# Patient Record
Sex: MALE | Race: BLACK OR AFRICAN AMERICAN | Employment: FULL TIME | URBAN - METROPOLITAN AREA
[De-identification: names, ages, dates, MRNs, and addresses within clinical notes are randomized per-mention and may not be internally consistent; named-entity substitution may affect disease eponyms.]

---

## 2017-03-03 ENCOUNTER — TELEPHONE (OUTPATIENT)
Dept: FAMILY MEDICINE CLINIC | Facility: CLINIC | Age: 47
End: 2017-03-03

## 2017-03-03 RX ORDER — LISINOPRIL 20 MG/1
20 TABLET ORAL 2 TIMES DAILY
Qty: 60 TABLET | Refills: 0 | Status: SHIPPED | OUTPATIENT
Start: 2017-03-03 | End: 2017-03-18

## 2017-03-03 RX ORDER — AMLODIPINE BESYLATE 10 MG/1
10 TABLET ORAL EVERY EVENING
Qty: 30 TABLET | Refills: 0 | Status: SHIPPED | OUTPATIENT
Start: 2017-03-03 | End: 2017-10-05

## 2017-03-03 RX ORDER — HYDROCHLOROTHIAZIDE 12.5 MG/1
12.5 CAPSULE, GELATIN COATED ORAL EVERY MORNING
Qty: 30 CAPSULE | Refills: 0 | Status: SHIPPED | OUTPATIENT
Start: 2017-03-03 | End: 2017-03-18

## 2017-03-03 NOTE — TELEPHONE ENCOUNTER
rxs approved for qty 27 NR  Dr Mara Adams is booked on her Saturday in March and right now there is no Saturday schedule for April. Patient was offered an appt on March 11 at 9:45am but he declined. Said he had another appt.  He was also offered some later ap

## 2017-03-04 ENCOUNTER — OFFICE VISIT (OUTPATIENT)
Dept: FAMILY MEDICINE CLINIC | Facility: CLINIC | Age: 47
End: 2017-03-04

## 2017-03-04 VITALS
OXYGEN SATURATION: 99 % | RESPIRATION RATE: 12 BRPM | BODY MASS INDEX: 30 KG/M2 | HEART RATE: 85 BPM | SYSTOLIC BLOOD PRESSURE: 124 MMHG | TEMPERATURE: 98 F | WEIGHT: 218 LBS | DIASTOLIC BLOOD PRESSURE: 78 MMHG

## 2017-03-04 DIAGNOSIS — E55.9 VITAMIN D DEFICIENCY: Primary | ICD-10-CM

## 2017-03-04 DIAGNOSIS — I10 ESSENTIAL HYPERTENSION, BENIGN: ICD-10-CM

## 2017-03-04 DIAGNOSIS — D64.9 ANEMIA, UNSPECIFIED TYPE: ICD-10-CM

## 2017-03-04 PROCEDURE — 99214 OFFICE O/P EST MOD 30 MIN: CPT | Performed by: FAMILY MEDICINE

## 2017-03-04 RX ORDER — AMLODIPINE BESYLATE 10 MG/1
10 TABLET ORAL EVERY MORNING
Qty: 90 TABLET | Refills: 0 | Status: SHIPPED | OUTPATIENT
Start: 2017-03-04 | End: 2017-03-18

## 2017-03-04 RX ORDER — LOSARTAN POTASSIUM 50 MG/1
50 TABLET ORAL EVERY MORNING
Qty: 90 TABLET | Refills: 0 | Status: SHIPPED | OUTPATIENT
Start: 2017-03-04 | End: 2017-07-01

## 2017-03-04 NOTE — PROGRESS NOTES
HPI:    Patient ID: Andres Amaya is a 56year old male. HPI  Patient is here for follow-up of high blood pressure, anemia, low vitamin D. In the past she had taken low vitamin D powder supplement 2 bottles.   He is not currently taking vitamin D. rales, air entry is adequate, no accessory respiratory muscle use, no chest asymmetry, normal excursion. GI:  bowel sound positive in all four quadrants, abdomen is soft, non-tender, non-distended, no hepatosplenomegaly, no abnormal aortic pulsation.    NE

## 2017-03-18 ENCOUNTER — TELEPHONE (OUTPATIENT)
Dept: FAMILY MEDICINE CLINIC | Facility: CLINIC | Age: 47
End: 2017-03-18

## 2017-03-18 ENCOUNTER — OFFICE VISIT (OUTPATIENT)
Dept: FAMILY MEDICINE CLINIC | Facility: CLINIC | Age: 47
End: 2017-03-18

## 2017-03-18 VITALS
RESPIRATION RATE: 12 BRPM | OXYGEN SATURATION: 99 % | BODY MASS INDEX: 30.66 KG/M2 | HEART RATE: 87 BPM | DIASTOLIC BLOOD PRESSURE: 80 MMHG | WEIGHT: 219 LBS | SYSTOLIC BLOOD PRESSURE: 124 MMHG | HEIGHT: 71 IN

## 2017-03-18 DIAGNOSIS — I10 ESSENTIAL HYPERTENSION, BENIGN: Primary | ICD-10-CM

## 2017-03-18 DIAGNOSIS — D64.9 ANEMIA, UNSPECIFIED TYPE: ICD-10-CM

## 2017-03-18 DIAGNOSIS — Z00.00 ROUTINE ADULT HEALTH MAINTENANCE: Primary | ICD-10-CM

## 2017-03-18 DIAGNOSIS — E55.9 VITAMIN D DEFICIENCY: ICD-10-CM

## 2017-03-18 DIAGNOSIS — Z00.00 LABORATORY EXAM ORDERED AS PART OF ROUTINE GENERAL MEDICAL EXAMINATION: ICD-10-CM

## 2017-03-18 PROCEDURE — 99396 PREV VISIT EST AGE 40-64: CPT | Performed by: FAMILY MEDICINE

## 2017-03-20 LAB
ABSOLUTE BASOPHILS: 15 CELLS/UL (ref 0–200)
ABSOLUTE EOSINOPHILS: 281 CELLS/UL (ref 15–500)
ABSOLUTE LYMPHOCYTES: 1841 CELLS/UL (ref 850–3900)
ABSOLUTE MONOCYTES: 296 CELLS/UL (ref 200–950)
ABSOLUTE NEUTROPHILS: 2667 CELLS/UL (ref 1500–7800)
ALBUMIN/GLOBULIN RATIO: 1.2 (CALC) (ref 1–2.5)
ALBUMIN: 4.2 G/DL (ref 3.6–5.1)
ALKALINE PHOSPHATASE: 86 U/L (ref 40–115)
ALT: 20 U/L (ref 9–46)
AST: 21 U/L (ref 10–40)
BASOPHILS: 0.3 %
BILIRUBIN, TOTAL: 0.4 MG/DL (ref 0.2–1.2)
BUN: 10 MG/DL (ref 7–25)
CALCIUM: 9.5 MG/DL (ref 8.6–10.3)
CARBON DIOXIDE: 29 MMOL/L (ref 20–31)
CHLORIDE: 104 MMOL/L (ref 98–110)
CHOL/HDLC RATIO: 2.6 (CALC)
CHOLESTEROL, TOTAL: 118 MG/DL (ref 125–200)
CREATININE: 1.05 MG/DL (ref 0.6–1.35)
EGFR IF AFRICN AM: 98 ML/MIN/1.73M2
EGFR IF NONAFRICN AM: 85 ML/MIN/1.73M2
EOSINOPHILS: 5.5 %
GLOBULIN: 3.4 G/DL (CALC) (ref 1.9–3.7)
GLUCOSE: 97 MG/DL (ref 65–99)
HDL CHOLESTEROL: 46 MG/DL
HEMATOCRIT: 40.4 % (ref 38.5–50)
HEMOGLOBIN: 12.8 G/DL (ref 13.2–17.1)
LDL-CHOLESTEROL: 61 MG/DL (CALC)
LYMPHOCYTES: 36.1 %
MCH: 24.8 PG (ref 27–33)
MCHC: 31.8 G/DL (ref 32–36)
MCV: 78.2 FL (ref 80–100)
MONOCYTES: 5.8 %
MPV: 10.1 FL (ref 7.5–12.5)
NEUTROPHILS: 52.3 %
NON-HDL CHOLESTEROL: 72 MG/DL (CALC)
PLATELET COUNT: 214 THOUSAND/UL (ref 140–400)
POTASSIUM: 3.5 MMOL/L (ref 3.5–5.3)
PROTEIN, TOTAL: 7.6 G/DL (ref 6.1–8.1)
RDW: 16.7 % (ref 11–15)
RED BLOOD CELL COUNT: 5.17 MILLION/UL (ref 4.2–5.8)
SODIUM: 141 MMOL/L (ref 135–146)
TRIGLYCERIDES: 54 MG/DL
VITAMIN D, 25-OH, TOTAL: 42 NG/ML (ref 30–100)
WHITE BLOOD CELL COUNT: 5.1 THOUSAND/UL (ref 3.8–10.8)

## 2017-03-21 NOTE — PROGRESS NOTES
Alicia Fairbanks is a 56year old male who presents for a complete physical exam.   HPI:   Pt complains of nothing.   Urination changes no  ED symptoms no  Immunizations needed no  Wt Readings from Last 6 Encounters:  03/18/17 : 219 lb  03/04/17 : 218 lb LYMPHOCYTES 36.1 %   MONOCYTES 5.8 %   EOSINOPHILS 5.5 %   BASOPHILS 0.3 %   -LIPID PANEL   Result Value Ref Range   CHOLESTEROL, TOTAL 118 (L) 125 - 200 mg/dL   HDL CHOLESTEROL 46 > OR = 40 mg/dL   TRIGLYCERIDES 54 <150 mg/dL   LDL-CHOLESTEROL 61 <130 m glands/polyuria/polydypsia  ALL/ASTHMA: denies allergic rhinitis/asthma    EXAM:   /80 mmHg  Pulse 87  Resp 12  Ht 71\"  Wt 219 lb  BMI 30.56 kg/m2  SpO2 99%  Body mass index is 30.56 kg/(m^2).    GENERAL: NAD, pleasant, well developed, normal voice history of colon or prostate cancer, patient instructed to get colon cancer screening and prostate cancer screening done which were discussed in detail. Pt educated on immunizations appropriate for age.      The patient verbalizes understanding of these

## 2017-07-03 RX ORDER — AMLODIPINE BESYLATE 10 MG/1
TABLET ORAL
Qty: 90 TABLET | Refills: 0 | Status: SHIPPED | OUTPATIENT
Start: 2017-07-03 | End: 2017-11-15

## 2017-07-03 RX ORDER — LOSARTAN POTASSIUM 50 MG/1
TABLET ORAL
Qty: 90 TABLET | Refills: 0 | Status: SHIPPED | OUTPATIENT
Start: 2017-07-03 | End: 2017-10-05

## 2017-07-03 NOTE — TELEPHONE ENCOUNTER
Refill protocol passed because the patient met the following protocol for    No future appointments.

## 2017-10-05 RX ORDER — LOSARTAN POTASSIUM 50 MG/1
TABLET ORAL
Qty: 90 TABLET | Refills: 0 | Status: SHIPPED | OUTPATIENT
Start: 2017-10-05 | End: 2018-01-30

## 2017-10-05 RX ORDER — AMLODIPINE BESYLATE 10 MG/1
10 TABLET ORAL EVERY EVENING
Qty: 30 TABLET | Refills: 0 | Status: SHIPPED | OUTPATIENT
Start: 2017-10-05 | End: 2017-11-11

## 2017-11-11 ENCOUNTER — APPOINTMENT (OUTPATIENT)
Dept: LAB | Age: 47
End: 2017-11-11
Attending: FAMILY MEDICINE
Payer: COMMERCIAL

## 2017-11-11 ENCOUNTER — OFFICE VISIT (OUTPATIENT)
Dept: FAMILY MEDICINE CLINIC | Facility: CLINIC | Age: 47
End: 2017-11-11

## 2017-11-11 VITALS
WEIGHT: 219 LBS | BODY MASS INDEX: 31 KG/M2 | OXYGEN SATURATION: 100 % | SYSTOLIC BLOOD PRESSURE: 138 MMHG | HEART RATE: 101 BPM | RESPIRATION RATE: 12 BRPM | DIASTOLIC BLOOD PRESSURE: 78 MMHG

## 2017-11-11 DIAGNOSIS — I10 ESSENTIAL HYPERTENSION, BENIGN: Primary | ICD-10-CM

## 2017-11-11 DIAGNOSIS — D64.9 ANEMIA, UNSPECIFIED TYPE: ICD-10-CM

## 2017-11-11 PROCEDURE — 36415 COLL VENOUS BLD VENIPUNCTURE: CPT | Performed by: FAMILY MEDICINE

## 2017-11-11 PROCEDURE — 85027 COMPLETE CBC AUTOMATED: CPT | Performed by: FAMILY MEDICINE

## 2017-11-11 PROCEDURE — 99214 OFFICE O/P EST MOD 30 MIN: CPT | Performed by: FAMILY MEDICINE

## 2017-11-15 RX ORDER — AMLODIPINE BESYLATE 10 MG/1
TABLET ORAL
Qty: 90 TABLET | Refills: 0 | Status: SHIPPED | OUTPATIENT
Start: 2017-11-15 | End: 2019-11-09

## 2017-11-15 NOTE — PROGRESS NOTES
HPI:    Patient ID: Andres Amaya is a 56year old male. HPI  Patient is here for follow-up of high blood pressure. He feels well and has no complaints.   Review of Systems  Negative except for the above         Current Outpatient Prescriptions:  Abida

## 2018-01-29 RX ORDER — AMLODIPINE BESYLATE 10 MG/1
TABLET ORAL
Qty: 90 TABLET | Refills: 0 | Status: CANCELLED | OUTPATIENT
Start: 2018-01-29

## 2018-01-29 RX ORDER — LOSARTAN POTASSIUM 50 MG/1
TABLET ORAL
Qty: 90 TABLET | Refills: 0 | Status: CANCELLED | OUTPATIENT
Start: 2018-01-29

## 2018-01-30 RX ORDER — LOSARTAN POTASSIUM 50 MG/1
TABLET ORAL
Qty: 90 TABLET | Refills: 0 | Status: SHIPPED | OUTPATIENT
Start: 2018-01-30 | End: 2018-04-25

## 2018-01-30 RX ORDER — AMLODIPINE BESYLATE 10 MG/1
TABLET ORAL
Qty: 90 TABLET | Refills: 0 | Status: SHIPPED | OUTPATIENT
Start: 2018-01-30 | End: 2018-04-25

## 2018-01-30 NOTE — TELEPHONE ENCOUNTER
Requesting: Losartan 50mg and Amlodipine 10mg  LOV: 11/11/17  RTC: -  Last Relevant Labs: 3/18/17  Filled: 10/5/17 #90 with 0 refills - Losartan 50mg  Filled: 11/15/17 #90 with 0 refills - Amlodipine 10mg    No future appointments.     Losartan and Amlodipi

## 2018-04-18 ENCOUNTER — HOSPITAL ENCOUNTER (OUTPATIENT)
Age: 48
Discharge: HOME | End: 2018-04-18
Attending: FAMILY MEDICINE
Payer: COMMERCIAL

## 2018-04-18 VITALS
RESPIRATION RATE: 20 BRPM | TEMPERATURE: 98 F | HEART RATE: 94 BPM | OXYGEN SATURATION: 97 % | SYSTOLIC BLOOD PRESSURE: 148 MMHG | DIASTOLIC BLOOD PRESSURE: 81 MMHG

## 2018-04-18 DIAGNOSIS — K05.6 PERIODONTAL DISEASE: ICD-10-CM

## 2018-04-18 DIAGNOSIS — I88.9 CERVICAL ADENITIS: ICD-10-CM

## 2018-04-18 DIAGNOSIS — M62.838 TRAPEZIUS MUSCLE SPASM: Primary | ICD-10-CM

## 2018-04-18 PROCEDURE — 99213 OFFICE O/P EST LOW 20 MIN: CPT

## 2018-04-18 PROCEDURE — 99204 OFFICE O/P NEW MOD 45 MIN: CPT

## 2018-04-18 RX ORDER — AMOXICILLIN AND CLAVULANATE POTASSIUM 875; 125 MG/1; MG/1
1 TABLET, FILM COATED ORAL 2 TIMES DAILY
Qty: 20 TABLET | Refills: 0 | Status: SHIPPED | OUTPATIENT
Start: 2018-04-18 | End: 2018-04-28

## 2018-04-18 RX ORDER — NAPROXEN 500 MG/1
500 TABLET ORAL 2 TIMES DAILY PRN
Qty: 20 TABLET | Refills: 0 | Status: SHIPPED | OUTPATIENT
Start: 2018-04-18 | End: 2018-04-25

## 2018-04-18 RX ORDER — CYCLOBENZAPRINE HCL 10 MG
10 TABLET ORAL 3 TIMES DAILY PRN
Qty: 15 TABLET | Refills: 0 | Status: SHIPPED | OUTPATIENT
Start: 2018-04-18 | End: 2018-04-23

## 2018-04-18 RX ORDER — COVID-19 ANTIGEN TEST
220 KIT MISCELLANEOUS
COMMUNITY

## 2018-04-18 NOTE — ED INITIAL ASSESSMENT (HPI)
Patient presents with cc of right neck swelling/tenderness for about a month. No fever noted. Went to dentist thinking it was gum related but everthing checked out. Also,c/o right shoulder pain x 5 days. He states unable to sleep due to poor pillows. No direct

## 2018-04-18 NOTE — ED PROVIDER NOTES
Patient Seen in: [facility] [place]    History   Patient presents with:  Swelling  Shoulder Pain    Stated Complaint: COLD X 1 MONTH / SHOULDER PAIN RIGHT    HPI  52 yo M here with complaints of cold X 1 month and now with R shoulder pa distended  NEURO: Alert and oriented to person place and time  GAIT: Normal          ED Course   Labs Reviewed - No data to display    Orders Placed This Encounter      Cyclobenzaprine HCl 10 MG Oral Tab          Sig: Take 1 tablet (10 mg total) by mouth 3 disease  Cervical adenitis    Disposition:  Discharge  4/18/2018  9:06 am    Follow-up:  Onur Zhang MD  [address] [place], [place] [id number] [phone]    In 1 week  As needed        Medications Prescribed:  Discharge

## 2018-04-25 ENCOUNTER — TELEPHONE (OUTPATIENT)
Dept: FAMILY MEDICINE CLINIC | Facility: CLINIC | Age: 48
End: 2018-04-25

## 2018-04-25 ENCOUNTER — HOSPITAL ENCOUNTER (OUTPATIENT)
Age: 48
Discharge: HOME | End: 2018-04-25
Payer: COMMERCIAL

## 2018-04-25 VITALS
DIASTOLIC BLOOD PRESSURE: 87 MMHG | HEART RATE: 93 BPM | SYSTOLIC BLOOD PRESSURE: 144 MMHG | TEMPERATURE: 99 F | OXYGEN SATURATION: 98 % | RESPIRATION RATE: 20 BRPM

## 2018-04-25 DIAGNOSIS — J02.0 STREPTOCOCCUS PHARYNGITIS: Primary | ICD-10-CM

## 2018-04-25 PROCEDURE — 99213 OFFICE O/P EST LOW 20 MIN: CPT

## 2018-04-25 PROCEDURE — 87430 STREP A AG IA: CPT | Performed by: PHYSICIAN ASSISTANT

## 2018-04-25 PROCEDURE — 99214 OFFICE O/P EST MOD 30 MIN: CPT

## 2018-04-25 RX ORDER — METHYLPREDNISOLONE 4 MG/1
TABLET ORAL
Qty: 1 PACKAGE | Refills: 0 | Status: SHIPPED | OUTPATIENT
Start: 2018-04-25 | End: 2018-09-04

## 2018-04-25 RX ORDER — CYCLOBENZAPRINE HCL 10 MG
10 TABLET ORAL 3 TIMES DAILY PRN
COMMUNITY
End: 2018-09-04

## 2018-04-25 RX ORDER — LOSARTAN POTASSIUM 50 MG/1
TABLET ORAL
Qty: 30 TABLET | Refills: 0 | Status: SHIPPED | OUTPATIENT
Start: 2018-04-25 | End: 2018-05-14

## 2018-04-25 RX ORDER — AMLODIPINE BESYLATE 10 MG/1
TABLET ORAL
Qty: 30 TABLET | Refills: 0 | Status: SHIPPED | OUTPATIENT
Start: 2018-04-25 | End: 2018-05-14

## 2018-04-25 RX ORDER — LOSARTAN POTASSIUM 50 MG/1
TABLET ORAL
Qty: 90 TABLET | Refills: 0 | Status: CANCELLED | OUTPATIENT
Start: 2018-04-25

## 2018-04-25 RX ORDER — AMLODIPINE BESYLATE 10 MG/1
TABLET ORAL
Qty: 90 TABLET | Refills: 0 | Status: CANCELLED | OUTPATIENT
Start: 2018-04-25

## 2018-04-25 RX ORDER — AZITHROMYCIN 250 MG/1
TABLET, FILM COATED ORAL
Qty: 1 PACKAGE | Refills: 0 | Status: SHIPPED | OUTPATIENT
Start: 2018-04-25 | End: 2018-04-30

## 2018-04-25 NOTE — TELEPHONE ENCOUNTER
Requesting Losartan and Amlodiine  LOV: 11/11/17  RTC: not noted  Last Relevant Labs: 3/18/17  Filled: 1/30/18 #90 with 0 refills    Future Appointments  Date Time Provider [address]   5/14/2018 10:15 AM Onur Zhang MD [facility] [address] [facility] [address]

## 2018-04-25 NOTE — ED INITIAL ASSESSMENT (HPI)
Complains of sore throat, ear pressure, neck pain and shoulder pain for the last five days. Currently on Augmentin and Flexeril that was given on 4/18/18 which has not helped.

## 2018-04-25 NOTE — ED PROVIDER NOTES
Patient Seen in: THE [facility]    History   Patient presents with:  Sore Throat    Stated Complaint: SORE THROAT, EAR PAIN    HPI    Patient is a pleasant 45-year-old male.   7 days prior to arrival, patient was evaluated at this fa rhinorrhea  Lung: No distress, RR, no retraction, breath sounds are clear bilaterally  Cardio: Regular rate and rhythm, normal S1-S2, no murmur appreciable  Extremities: Full ROM, no deformity, NVI  Back: Full range of motion  Skin: No sign of trauma, Skin

## 2018-05-05 ENCOUNTER — HOSPITAL ENCOUNTER (OUTPATIENT)
Age: 48
Discharge: HOME | End: 2018-05-05
Payer: COMMERCIAL

## 2018-05-05 VITALS
SYSTOLIC BLOOD PRESSURE: 149 MMHG | OXYGEN SATURATION: 98 % | RESPIRATION RATE: 22 BRPM | HEART RATE: 87 BPM | DIASTOLIC BLOOD PRESSURE: 86 MMHG | TEMPERATURE: 98 F

## 2018-05-05 DIAGNOSIS — K12.0 APHTHOUS STOMATITIS: ICD-10-CM

## 2018-05-05 DIAGNOSIS — J02.9 PHARYNGITIS, UNSPECIFIED ETIOLOGY: Primary | ICD-10-CM

## 2018-05-05 PROCEDURE — 99214 OFFICE O/P EST MOD 30 MIN: CPT

## 2018-05-05 PROCEDURE — 87081 CULTURE SCREEN ONLY: CPT | Performed by: NURSE PRACTITIONER

## 2018-05-05 PROCEDURE — 87430 STREP A AG IA: CPT | Performed by: NURSE PRACTITIONER

## 2018-05-05 RX ORDER — CEFDINIR 300 MG/1
300 CAPSULE ORAL 2 TIMES DAILY
Qty: 20 CAPSULE | Refills: 0 | Status: SHIPPED | OUTPATIENT
Start: 2018-05-05 | End: 2018-05-15

## 2018-05-05 NOTE — ED INITIAL ASSESSMENT (HPI)
Here for possible reoccurrence of strep throat. Sts after he completed antibiotic 2 days later his ST returned.

## 2018-05-05 NOTE — ED PROVIDER NOTES
Patient Seen in: THE [facility]    History   Patient presents with:  Sore Throat    Stated Complaint: Sore Throat x 15 days    HPI  Patient is 52-year-old male who presents with 4 day history of sore throat  And ulcers on gums and o Constitutional: He is oriented to person, place, and time. He appears well-developed and well-nourished. No distress.    HENT:   Right Ear: Tympanic membrane and external ear normal.   Left Ear: Tympanic membrane and external ear normal.   Nose: Nose norm appearance and there is no cervical adenopathy.           Disposition and Plan     Clinical Impression:  Pharyngitis, unspecified etiology  (primary encounter diagnosis)  Aphthous stomatitis    Disposition:  Discharge  5/5/2018  4:58 pm    Follow-up:  Sandra

## 2018-05-14 ENCOUNTER — HOSPITAL ENCOUNTER (OUTPATIENT)
Dept: GENERAL RADIOLOGY | Age: 48
Discharge: HOME | End: 2018-05-14
Attending: FAMILY MEDICINE
Payer: COMMERCIAL

## 2018-05-14 ENCOUNTER — OFFICE VISIT (OUTPATIENT)
Dept: FAMILY MEDICINE CLINIC | Facility: CLINIC | Age: 48
End: 2018-05-14

## 2018-05-14 ENCOUNTER — APPOINTMENT (OUTPATIENT)
Dept: LAB | Age: 48
End: 2018-05-14
Attending: FAMILY MEDICINE
Payer: COMMERCIAL

## 2018-05-14 VITALS
HEIGHT: 71 IN | TEMPERATURE: 98 F | BODY MASS INDEX: 31.15 KG/M2 | OXYGEN SATURATION: 99 % | RESPIRATION RATE: 14 BRPM | DIASTOLIC BLOOD PRESSURE: 78 MMHG | HEART RATE: 79 BPM | SYSTOLIC BLOOD PRESSURE: 124 MMHG | WEIGHT: 222.5 LBS

## 2018-05-14 DIAGNOSIS — Z00.00 ROUTINE ADULT HEALTH MAINTENANCE: ICD-10-CM

## 2018-05-14 DIAGNOSIS — R06.00 DYSPNEA, UNSPECIFIED TYPE: ICD-10-CM

## 2018-05-14 DIAGNOSIS — Z13.31 NEGATIVE DEPRESSION SCREENING: ICD-10-CM

## 2018-05-14 DIAGNOSIS — J02.0 STREP THROAT: ICD-10-CM

## 2018-05-14 DIAGNOSIS — I10 ESSENTIAL HYPERTENSION, BENIGN: ICD-10-CM

## 2018-05-14 DIAGNOSIS — M25.511 ACUTE PAIN OF RIGHT SHOULDER: Primary | ICD-10-CM

## 2018-05-14 DIAGNOSIS — E55.9 VITAMIN D DEFICIENCY: ICD-10-CM

## 2018-05-14 PROCEDURE — 99214 OFFICE O/P EST MOD 30 MIN: CPT | Performed by: FAMILY MEDICINE

## 2018-05-14 PROCEDURE — 84439 ASSAY OF FREE THYROXINE: CPT

## 2018-05-14 PROCEDURE — 84443 ASSAY THYROID STIM HORMONE: CPT

## 2018-05-14 PROCEDURE — 71046 X-RAY EXAM CHEST 2 VIEWS: CPT | Performed by: FAMILY MEDICINE

## 2018-05-14 PROCEDURE — 80050 GENERAL HEALTH PANEL: CPT | Performed by: FAMILY MEDICINE

## 2018-05-14 PROCEDURE — 93010 ELECTROCARDIOGRAM REPORT: CPT | Performed by: INTERNAL MEDICINE

## 2018-05-14 PROCEDURE — 80061 LIPID PANEL: CPT | Performed by: FAMILY MEDICINE

## 2018-05-14 PROCEDURE — 82306 VITAMIN D 25 HYDROXY: CPT | Performed by: FAMILY MEDICINE

## 2018-05-14 PROCEDURE — 36415 COLL VENOUS BLD VENIPUNCTURE: CPT | Performed by: FAMILY MEDICINE

## 2018-05-14 PROCEDURE — 93005 ELECTROCARDIOGRAM TRACING: CPT

## 2018-05-14 RX ORDER — AMLODIPINE BESYLATE 10 MG/1
TABLET ORAL
Qty: 90 TABLET | Refills: 1 | Status: SHIPPED | OUTPATIENT
Start: 2018-05-14 | End: 2018-09-04

## 2018-05-14 RX ORDER — LOSARTAN POTASSIUM 50 MG/1
TABLET ORAL
Qty: 90 TABLET | Refills: 1 | Status: SHIPPED | OUTPATIENT
Start: 2018-05-14 | End: 2018-12-10

## 2018-05-14 NOTE — PROGRESS NOTES
HPI:    Patient ID: Barak Crowe is a 53year old male. HPI  Patient presents with:  Hypertension: pt states he has been doubling both bp meds due to elevated blood pressure    f/u of right shoulder pain, strep throat.   Patient states of getting so hypertension, benign  Dyspnea, unspecified type  Vitamin d deficiency  Routine adult health maintenance  Negative depression screening  Right shoulder pain, changed pillow and mattress.   Regarding the shoulder pain I would recommend anti-inflammatory, stre

## 2018-05-15 PROBLEM — E55.9 VITAMIN D DEFICIENCY: Status: ACTIVE | Noted: 2018-05-15

## 2018-05-15 PROBLEM — M25.511 ACUTE PAIN OF RIGHT SHOULDER: Status: ACTIVE | Noted: 2018-05-15

## 2018-05-15 PROBLEM — J02.0 STREP THROAT: Status: ACTIVE | Noted: 2018-05-15

## 2018-05-15 PROBLEM — R06.00 DYSPNEA: Status: ACTIVE | Noted: 2018-05-15

## 2018-05-17 ENCOUNTER — OFFICE VISIT (OUTPATIENT)
Dept: FAMILY MEDICINE CLINIC | Facility: CLINIC | Age: 48
End: 2018-05-17

## 2018-05-17 VITALS
DIASTOLIC BLOOD PRESSURE: 80 MMHG | BODY MASS INDEX: 31.08 KG/M2 | WEIGHT: 222 LBS | RESPIRATION RATE: 12 BRPM | HEIGHT: 71 IN | SYSTOLIC BLOOD PRESSURE: 128 MMHG | TEMPERATURE: 98 F | OXYGEN SATURATION: 99 % | HEART RATE: 91 BPM

## 2018-05-17 DIAGNOSIS — Z00.00 ROUTINE ADULT HEALTH MAINTENANCE: Primary | ICD-10-CM

## 2018-05-17 DIAGNOSIS — D72.819 LEUKOPENIA, UNSPECIFIED TYPE: ICD-10-CM

## 2018-05-17 PROCEDURE — 99396 PREV VISIT EST AGE 40-64: CPT | Performed by: FAMILY MEDICINE

## 2018-05-17 NOTE — PROGRESS NOTES
Lucio Thurston is a 53year old male who presents for a complete physical exam.   HPI:   Pt complains of nothing.   Urination changes no  ED symptoms no  Immunizations needed no  Wt Readings from Last 6 Encounters:  05/17/18 : 222 lb  05/14/18 : 222 lb 8 Cancer Father       Social History:  Smoking status: Never Smoker                                                              Smokeless tobacco: Never Used                      Alcohol use:  No                  REVIEW OF SYSTEMS:   GENERAL: feels well over BS, non-distended, non-tender to palpation, no HSM/masses/pulsations  MUSCULOSKELETAL: Back with normal AROM, no joint swelling, extremities x 4 with normal strength 5/5 and symmetric and with normal AROM/PROM.    EXTREMITIES: no cyanosis, clubbing or edema [E]

## 2018-05-22 ENCOUNTER — TELEPHONE (OUTPATIENT)
Dept: FAMILY MEDICINE CLINIC | Facility: CLINIC | Age: 48
End: 2018-05-22

## 2018-05-22 DIAGNOSIS — D72.819 LEUKOPENIA, UNSPECIFIED TYPE: Primary | ICD-10-CM

## 2018-05-22 NOTE — TELEPHONE ENCOUNTER
Test: HIV AG AB COMBO  Order Status Reason By On   Canceled Cancelled by Lab Lourdes Smith 5/17/18 2124   No Gold top received.  Follow up task put in.      Margretag Jensen states that they did not receive a gold top tube to run HIV Combo test. Lab states that if test

## 2018-05-23 NOTE — TELEPHONE ENCOUNTER
Patient informed that [facility] did not draw the HIV Doctor had ordered. New order placed and patient will go to [facility] and have done.

## 2018-06-06 NOTE — TELEPHONE ENCOUNTER
Pt is calling to follow up on rx request for   Lidocaine Viscous 2 % Mouth/Throat Solution 100 mL 0 5/5/2018     Sig - Route: Take 5 mL by mouth every 3 (three) hours as needed for Pain (sore throat).  - Oral    E-Prescribing Status: Receipt confirmed by ph

## 2018-06-07 NOTE — TELEPHONE ENCOUNTER
Requesting Lidocaine Viscous 2%  LOV: 5/17/18  RTC: not noted  Last Relevant Labs: n/a  Filled: 5/5/18 #100 ml with 0 refills  Given by another provider    Future Appointments  Date Time Provider [address]   10/13/2018 11:30 AM Liv Sims MD

## 2018-07-10 ENCOUNTER — TELEPHONE (OUTPATIENT)
Dept: FAMILY MEDICINE CLINIC | Facility: CLINIC | Age: 48
End: 2018-07-10

## 2018-07-10 DIAGNOSIS — D72.819 LEUKOPENIA, UNSPECIFIED TYPE: Primary | ICD-10-CM

## 2018-07-10 NOTE — TELEPHONE ENCOUNTER
Patient got his letter to go over his results. Given result of lab and need to repeat in 6 months. Patient verbalized understanding. Order placed for recheck of HIV.

## 2018-08-28 ENCOUNTER — TELEPHONE (OUTPATIENT)
Dept: FAMILY MEDICINE CLINIC | Facility: CLINIC | Age: 48
End: 2018-08-28

## 2018-08-28 NOTE — TELEPHONE ENCOUNTER
Pt has been battling with sore throats, sores in mouth & strep at one time with in the last 3 months and he cant seem to shake this off. He is not sure what he should do . ..  He is questioning if he may need to see a specialist or does he need to be seen

## 2018-08-28 NOTE — TELEPHONE ENCOUNTER
Patient notified we would want to see him since he hasnt been seen since May. Patient scheduled the following appt:      Future Appointments  Date Time Provider John [address]   9/4/2018 1:30 PM Tommy Calixto MD [facility] [address] [facility] [address]   10/13/2018 11:30

## 2018-09-04 ENCOUNTER — APPOINTMENT (OUTPATIENT)
Dept: LAB | Age: 48
End: 2018-09-04
Attending: FAMILY MEDICINE
Payer: COMMERCIAL

## 2018-09-04 ENCOUNTER — OFFICE VISIT (OUTPATIENT)
Dept: FAMILY MEDICINE CLINIC | Facility: CLINIC | Age: 48
End: 2018-09-04
Payer: COMMERCIAL

## 2018-09-04 VITALS
HEIGHT: 71 IN | SYSTOLIC BLOOD PRESSURE: 128 MMHG | HEART RATE: 82 BPM | RESPIRATION RATE: 12 BRPM | TEMPERATURE: 98 F | DIASTOLIC BLOOD PRESSURE: 78 MMHG | WEIGHT: 227 LBS | BODY MASS INDEX: 31.78 KG/M2 | OXYGEN SATURATION: 99 %

## 2018-09-04 DIAGNOSIS — B00.1 RECURRENT COLD SORES: ICD-10-CM

## 2018-09-04 DIAGNOSIS — L98.9 SKIN LESION: ICD-10-CM

## 2018-09-04 DIAGNOSIS — L98.9 SKIN LESION: Primary | ICD-10-CM

## 2018-09-04 DIAGNOSIS — J31.2 SORE THROAT, CHRONIC: ICD-10-CM

## 2018-09-04 PROCEDURE — 36415 COLL VENOUS BLD VENIPUNCTURE: CPT | Performed by: FAMILY MEDICINE

## 2018-09-04 PROCEDURE — 86694 HERPES SIMPLEX NES ANTBDY: CPT | Performed by: FAMILY MEDICINE

## 2018-09-04 PROCEDURE — 99213 OFFICE O/P EST LOW 20 MIN: CPT | Performed by: FAMILY MEDICINE

## 2018-09-04 RX ORDER — RIBOFLAVIN (VITAMIN B2) 100 MG
100 TABLET ORAL DAILY
COMMUNITY

## 2018-09-06 LAB
HSV TYPE 1/2 COMBINED AB, IGG: >22.4 IV
HSV TYPE 1/2 COMBINED ABS, IGM: 0.36 IV

## 2018-09-06 NOTE — PROGRESS NOTES
HPI:    Patient ID: Libia Turcios is a 53year old male. HPI  Patient is here with complaint of having recurrent tonsillitis and pharyngitis as well as some sores on the outside of his mouth and on the inside this occurs frequently.   Right now he do length regarding recurrent sore throat and cold sores.     Orders Placed This Encounter      Herpes Simplex Virus I/II IgG and IgM [E]    Meds This Visit:  No prescriptions requested or ordered in this encounter       Imaging & Referrals:  ENT - INTERNAL

## 2018-09-13 ENCOUNTER — TELEPHONE (OUTPATIENT)
Dept: FAMILY MEDICINE CLINIC | Facility: CLINIC | Age: 48
End: 2018-09-13

## 2018-10-13 ENCOUNTER — OFFICE VISIT (OUTPATIENT)
Dept: FAMILY MEDICINE CLINIC | Facility: CLINIC | Age: 48
End: 2018-10-13
Payer: COMMERCIAL

## 2018-10-13 VITALS
SYSTOLIC BLOOD PRESSURE: 120 MMHG | OXYGEN SATURATION: 100 % | HEART RATE: 93 BPM | BODY MASS INDEX: 31.36 KG/M2 | WEIGHT: 224 LBS | TEMPERATURE: 98 F | DIASTOLIC BLOOD PRESSURE: 82 MMHG | RESPIRATION RATE: 12 BRPM | HEIGHT: 71 IN

## 2018-10-13 DIAGNOSIS — I10 ESSENTIAL HYPERTENSION, BENIGN: Primary | ICD-10-CM

## 2018-10-13 PROCEDURE — 99213 OFFICE O/P EST LOW 20 MIN: CPT | Performed by: FAMILY MEDICINE

## 2018-10-13 NOTE — PROGRESS NOTES
HPI:    Patient ID: Mike Ortiz is a 53year old male. HPI  Patient is here for follow-up of high blood pressure. He is doing well and has no complaints. He does have a chronic sore throat and some ulcers in the inner part of his cheeks.   He is lesions in the inner cheeks. Follow-up with an ENT recommendation as well. Blood pressure well controlled. Continue amlodipine and losartan as directed.   Patient will follow-up with me in May 2019 for complete physical.  No orders of the defined types

## 2018-12-11 RX ORDER — AMLODIPINE BESYLATE 10 MG/1
TABLET ORAL
Qty: 90 TABLET | Refills: 1 | Status: SHIPPED | OUTPATIENT
Start: 2018-12-11 | End: 2019-03-09

## 2018-12-11 RX ORDER — LOSARTAN POTASSIUM 50 MG/1
TABLET ORAL
Qty: 90 TABLET | Refills: 1 | Status: SHIPPED | OUTPATIENT
Start: 2018-12-11 | End: 2019-06-12

## 2018-12-11 NOTE — TELEPHONE ENCOUNTER
Requesting Amlodipine and Losartan  LOV: 10/13/18  RTC: not noted  Last Relevant Labs: 5/14/18  Filled: 5/14/18 #90 with 1 refills  Losartan  Filled: 5/14/18 #90 with 1 refill  Amlodipine    Future Appointments   Date Time Provider John Lane   6/8/

## 2019-03-09 ENCOUNTER — OFFICE VISIT (OUTPATIENT)
Dept: FAMILY MEDICINE CLINIC | Facility: CLINIC | Age: 49
End: 2019-03-09
Payer: COMMERCIAL

## 2019-03-09 VITALS
HEART RATE: 84 BPM | BODY MASS INDEX: 31.64 KG/M2 | SYSTOLIC BLOOD PRESSURE: 128 MMHG | TEMPERATURE: 98 F | OXYGEN SATURATION: 100 % | RESPIRATION RATE: 12 BRPM | HEIGHT: 71 IN | DIASTOLIC BLOOD PRESSURE: 80 MMHG | WEIGHT: 226 LBS

## 2019-03-09 DIAGNOSIS — J03.90 TONSILLITIS: Primary | ICD-10-CM

## 2019-03-09 DIAGNOSIS — R68.84 JAW PAIN: ICD-10-CM

## 2019-03-09 PROCEDURE — 99213 OFFICE O/P EST LOW 20 MIN: CPT | Performed by: FAMILY MEDICINE

## 2019-03-09 RX ORDER — PREDNISONE 20 MG/1
20 TABLET ORAL 2 TIMES DAILY
Qty: 10 TABLET | Refills: 0 | Status: SHIPPED | OUTPATIENT
Start: 2019-03-09 | End: 2019-03-14

## 2019-03-09 NOTE — PROGRESS NOTES
HPI:   Leeann Chauhan is a 51year old male that presents for cold symptoms. Patient is here with cold symptoms. He states he did see ENT and is very displeased with treatment he received.  He was given additional antibiotics and symptoms are recurren the following:    Height as of this encounter: 71\". Weight as of this encounter: 226 lb. Vital signs reviewed. Appears stated age, well groomed.   Physical Exam:  GEN:  Patient is alert, awake and oriented, well developed, well nourished, no apparent d

## 2019-03-16 ENCOUNTER — OFFICE VISIT (OUTPATIENT)
Dept: FAMILY MEDICINE CLINIC | Facility: CLINIC | Age: 49
End: 2019-03-16
Payer: COMMERCIAL

## 2019-03-16 ENCOUNTER — TELEPHONE (OUTPATIENT)
Dept: FAMILY MEDICINE CLINIC | Facility: CLINIC | Age: 49
End: 2019-03-16

## 2019-03-16 VITALS
TEMPERATURE: 98 F | BODY MASS INDEX: 31.64 KG/M2 | OXYGEN SATURATION: 98 % | RESPIRATION RATE: 12 BRPM | SYSTOLIC BLOOD PRESSURE: 130 MMHG | DIASTOLIC BLOOD PRESSURE: 78 MMHG | WEIGHT: 226 LBS | HEIGHT: 71 IN | HEART RATE: 94 BPM

## 2019-03-16 DIAGNOSIS — E55.9 VITAMIN D DEFICIENCY: ICD-10-CM

## 2019-03-16 DIAGNOSIS — J03.90 TONSILLITIS: Primary | ICD-10-CM

## 2019-03-16 DIAGNOSIS — Z00.00 ROUTINE ADULT HEALTH MAINTENANCE: ICD-10-CM

## 2019-03-16 PROCEDURE — 99213 OFFICE O/P EST LOW 20 MIN: CPT | Performed by: FAMILY MEDICINE

## 2019-03-16 RX ORDER — ONDANSETRON 4 MG/1
4 TABLET, FILM COATED ORAL 3 TIMES DAILY PRN
Qty: 6 TABLET | Refills: 0 | Status: SHIPPED | OUTPATIENT
Start: 2019-03-16 | End: 2019-03-16

## 2019-03-16 RX ORDER — CIPROFLOXACIN 500 MG/1
500 TABLET, FILM COATED ORAL 2 TIMES DAILY
Qty: 4 TABLET | Refills: 0 | Status: SHIPPED | OUTPATIENT
Start: 2019-03-16 | End: 2019-03-16

## 2019-03-16 NOTE — TELEPHONE ENCOUNTER
Please inform patient that he will be due for his complete physical after 5/20/2019.   He can do blood work fasting after that time and see me after that for physical.

## 2019-03-16 NOTE — PROGRESS NOTES
HPI:    Patient ID: Luke Singh is a 51year old male. HPI  Patient is here for follow-up of tonsillitis. He is feeling much better he has no pain in the throat now. No other symptoms. He did finish steroid medication.   Review of Systems  Negat encounter       Imaging & Referrals:  None       #[id number]

## 2019-06-08 ENCOUNTER — OFFICE VISIT (OUTPATIENT)
Dept: FAMILY MEDICINE CLINIC | Facility: CLINIC | Age: 49
End: 2019-06-08
Payer: COMMERCIAL

## 2019-06-08 ENCOUNTER — LAB ENCOUNTER (OUTPATIENT)
Dept: LAB | Age: 49
End: 2019-06-08
Attending: FAMILY MEDICINE
Payer: COMMERCIAL

## 2019-06-08 VITALS
HEART RATE: 88 BPM | BODY MASS INDEX: 31.5 KG/M2 | DIASTOLIC BLOOD PRESSURE: 80 MMHG | RESPIRATION RATE: 16 BRPM | SYSTOLIC BLOOD PRESSURE: 124 MMHG | OXYGEN SATURATION: 99 % | TEMPERATURE: 99 F | HEIGHT: 71 IN | WEIGHT: 225 LBS

## 2019-06-08 DIAGNOSIS — Z00.00 ROUTINE GENERAL MEDICAL EXAMINATION AT A HEALTH CARE FACILITY: ICD-10-CM

## 2019-06-08 DIAGNOSIS — Z00.00 ROUTINE GENERAL MEDICAL EXAMINATION AT A HEALTH CARE FACILITY: Primary | ICD-10-CM

## 2019-06-08 DIAGNOSIS — K12.0 APHTHOUS STOMATITIS: ICD-10-CM

## 2019-06-08 DIAGNOSIS — Z13.6 ISCHEMIC HEART DISEASE SCREEN: ICD-10-CM

## 2019-06-08 PROBLEM — M25.511 ACUTE PAIN OF RIGHT SHOULDER: Status: RESOLVED | Noted: 2018-05-15 | Resolved: 2019-06-08

## 2019-06-08 PROBLEM — J02.0 STREP THROAT: Status: RESOLVED | Noted: 2018-05-15 | Resolved: 2019-06-08

## 2019-06-08 PROBLEM — J03.90 TONSILLITIS: Status: RESOLVED | Noted: 2019-03-09 | Resolved: 2019-06-08

## 2019-06-08 PROBLEM — R06.00 DYSPNEA: Status: RESOLVED | Noted: 2018-05-15 | Resolved: 2019-06-08

## 2019-06-08 PROCEDURE — 80050 GENERAL HEALTH PANEL: CPT | Performed by: FAMILY MEDICINE

## 2019-06-08 PROCEDURE — 84153 ASSAY OF PSA TOTAL: CPT | Performed by: FAMILY MEDICINE

## 2019-06-08 PROCEDURE — 84439 ASSAY OF FREE THYROXINE: CPT | Performed by: FAMILY MEDICINE

## 2019-06-08 PROCEDURE — 82306 VITAMIN D 25 HYDROXY: CPT | Performed by: FAMILY MEDICINE

## 2019-06-08 PROCEDURE — 36415 COLL VENOUS BLD VENIPUNCTURE: CPT | Performed by: FAMILY MEDICINE

## 2019-06-08 PROCEDURE — 99396 PREV VISIT EST AGE 40-64: CPT | Performed by: FAMILY MEDICINE

## 2019-06-08 PROCEDURE — 80061 LIPID PANEL: CPT | Performed by: FAMILY MEDICINE

## 2019-06-08 NOTE — PROGRESS NOTES
Santiago Ayoub is a 51year old male who presents for a complete physical exam.   HPI:   Pt complains of having recurrence of what he calls cold sores which are in the inner part of the bottom lip. They do not hurt too much.   He does use Maalox and Rollo Delgado BIOPSY, POSSIBLE POLYPECTOMY 49622 N/A 9/16/2016    Performed by Shay Wang MD at [address] [place] [address]      Family History   Problem Relation Age of Onset   • Cancer Father       Social History:  Social History    Tobacco Use      Smoking status: Never Smoker tenderness  BREAST: no suspicious masses appreciated on palpation  LUNGS: CTA A/P, no wheezes/ronchi/rales/crackles, normal air excursion  CARDIOVASCULAR: RRR, no murmur, no lower extremity edema, pedal and femoral pulses 2+ and symmetric b/l  GI: normoact Encounter      CBC W Differential W Platelet [E]      Comp Metabolic Panel (14) [E]      Lipid Panel [E]      Vitamin D, 25-Hydroxy      TSH and Free T4      PSA      CT CALCIUM SCORING SPECIAL

## 2019-06-12 ENCOUNTER — TELEPHONE (OUTPATIENT)
Dept: FAMILY MEDICINE CLINIC | Facility: CLINIC | Age: 49
End: 2019-06-12

## 2019-06-13 RX ORDER — LOSARTAN POTASSIUM 100 MG/1
50 TABLET ORAL DAILY
Qty: 45 TABLET | Refills: 1 | Status: SHIPPED | OUTPATIENT
Start: 2019-06-13 | End: 2020-06-12

## 2019-06-13 RX ORDER — LOSARTAN POTASSIUM 50 MG/1
TABLET ORAL
Qty: 90 TABLET | Refills: 1 | Status: SHIPPED | OUTPATIENT
Start: 2019-06-13 | End: 2019-11-09

## 2019-06-13 RX ORDER — AMLODIPINE BESYLATE 10 MG/1
TABLET ORAL
Qty: 90 TABLET | Refills: 1 | Status: SHIPPED | OUTPATIENT
Start: 2019-06-13 | End: 2019-11-09

## 2019-06-13 NOTE — TELEPHONE ENCOUNTER
Hypertension Medications Protocol Passed6/12 9:38 AM   CMP or BMP in past 12 months    Last serum creatinine< 2.0    Appointment in past 6 or next 3 months

## 2019-07-03 ENCOUNTER — HOSPITAL ENCOUNTER (OUTPATIENT)
Dept: CT IMAGING | Age: 49
Discharge: HOME | End: 2019-07-03
Attending: FAMILY MEDICINE

## 2019-07-03 DIAGNOSIS — Z13.6 ISCHEMIC HEART DISEASE SCREEN: ICD-10-CM

## 2019-11-09 ENCOUNTER — OFFICE VISIT (OUTPATIENT)
Dept: FAMILY MEDICINE CLINIC | Facility: CLINIC | Age: 49
End: 2019-11-09
Payer: COMMERCIAL

## 2019-11-09 VITALS
HEART RATE: 82 BPM | HEIGHT: 71 IN | WEIGHT: 225.38 LBS | DIASTOLIC BLOOD PRESSURE: 80 MMHG | TEMPERATURE: 98 F | BODY MASS INDEX: 31.55 KG/M2 | RESPIRATION RATE: 16 BRPM | SYSTOLIC BLOOD PRESSURE: 122 MMHG

## 2019-11-09 DIAGNOSIS — M54.50 BILATERAL LOW BACK PAIN WITHOUT SCIATICA, UNSPECIFIED CHRONICITY: Primary | ICD-10-CM

## 2019-11-09 DIAGNOSIS — I10 ESSENTIAL HYPERTENSION, BENIGN: ICD-10-CM

## 2019-11-09 DIAGNOSIS — E55.9 VITAMIN D DEFICIENCY: ICD-10-CM

## 2019-11-09 PROCEDURE — 99214 OFFICE O/P EST MOD 30 MIN: CPT | Performed by: FAMILY MEDICINE

## 2019-11-09 RX ORDER — LOSARTAN POTASSIUM 50 MG/1
50 TABLET ORAL DAILY
Qty: 90 TABLET | Refills: 1 | Status: SHIPPED | OUTPATIENT
Start: 2019-11-09 | End: 2020-04-23

## 2019-11-09 RX ORDER — AMLODIPINE BESYLATE 10 MG/1
10 TABLET ORAL DAILY
Qty: 90 TABLET | Refills: 1 | Status: SHIPPED | OUTPATIENT
Start: 2019-11-09 | End: 2020-04-23

## 2019-11-09 RX ORDER — IBUPROFEN 600 MG/1
TABLET ORAL
Refills: 0 | COMMUNITY
Start: 2019-10-26 | End: 2020-06-12

## 2019-11-09 RX ORDER — METHYLPREDNISOLONE 4 MG/1
TABLET ORAL
Qty: 1 KIT | Refills: 0 | Status: SHIPPED | OUTPATIENT
Start: 2019-11-09 | End: 2020-06-12 | Stop reason: ALTCHOICE

## 2019-11-09 NOTE — PROGRESS NOTES
HPI:   Alcira Lopez is a 51year old male that presents for Patient presents with: Follow - Up: 6 month follow up   Test Results: Go over Ct cardiac calcium score.   Low Back Pain: Has been taking ibuprofen 600mg  Imm/Inj: Declined flu vaccine from the following:    Height as of this encounter: 71\". Weight as of this encounter: 225 lb 6.4 oz (102.2 kg). Vital signs reviewed. Appears stated age, well groomed.   Physical Exam:  GEN:  Patient is alert, awake and oriented, well developed, well n

## 2020-04-23 RX ORDER — AMLODIPINE BESYLATE 10 MG/1
10 TABLET ORAL DAILY
Qty: 90 TABLET | Refills: 1 | Status: SHIPPED | OUTPATIENT
Start: 2020-04-23 | End: 2020-09-28

## 2020-04-23 RX ORDER — LOSARTAN POTASSIUM 50 MG/1
50 TABLET ORAL DAILY
Qty: 90 TABLET | Refills: 1 | Status: SHIPPED | OUTPATIENT
Start: 2020-04-23 | End: 2020-09-28

## 2020-04-23 NOTE — TELEPHONE ENCOUNTER
Hypertension Medications Protocol Passed4/23 1:40 PM   CMP or BMP in past 12 months    Last serum creatinine< 2.0    Appointment in past 6 or next 3 months     Requesting amLODIPine Besylate 10 MG, losartan Potassium 50 MG  LOV: 11/9/19  RTC: 6 months  Las

## 2020-06-12 ENCOUNTER — APPOINTMENT (OUTPATIENT)
Dept: LAB | Age: 50
End: 2020-06-12
Attending: FAMILY MEDICINE
Payer: COMMERCIAL

## 2020-06-12 ENCOUNTER — OFFICE VISIT (OUTPATIENT)
Dept: FAMILY MEDICINE CLINIC | Facility: CLINIC | Age: 50
End: 2020-06-12
Payer: COMMERCIAL

## 2020-06-12 VITALS
HEART RATE: 72 BPM | BODY MASS INDEX: 31.55 KG/M2 | HEIGHT: 71 IN | TEMPERATURE: 99 F | WEIGHT: 225.38 LBS | DIASTOLIC BLOOD PRESSURE: 76 MMHG | SYSTOLIC BLOOD PRESSURE: 118 MMHG

## 2020-06-12 DIAGNOSIS — Z00.00 ROUTINE ADULT HEALTH MAINTENANCE: Primary | ICD-10-CM

## 2020-06-12 DIAGNOSIS — Z00.00 ROUTINE ADULT HEALTH MAINTENANCE: ICD-10-CM

## 2020-06-12 DIAGNOSIS — E55.9 VITAMIN D DEFICIENCY: ICD-10-CM

## 2020-06-12 PROCEDURE — 84153 ASSAY OF PSA TOTAL: CPT | Performed by: FAMILY MEDICINE

## 2020-06-12 PROCEDURE — 82306 VITAMIN D 25 HYDROXY: CPT | Performed by: FAMILY MEDICINE

## 2020-06-12 PROCEDURE — 84439 ASSAY OF FREE THYROXINE: CPT | Performed by: FAMILY MEDICINE

## 2020-06-12 PROCEDURE — 99396 PREV VISIT EST AGE 40-64: CPT | Performed by: FAMILY MEDICINE

## 2020-06-12 PROCEDURE — 80050 GENERAL HEALTH PANEL: CPT | Performed by: FAMILY MEDICINE

## 2020-06-12 PROCEDURE — 36415 COLL VENOUS BLD VENIPUNCTURE: CPT | Performed by: FAMILY MEDICINE

## 2020-06-12 PROCEDURE — 80061 LIPID PANEL: CPT | Performed by: FAMILY MEDICINE

## 2020-06-12 NOTE — PROGRESS NOTES
Dariel Segura is a 53year old male who presents for a complete physical exam.   HPI:   Pt complains of nothing.   Urination changes no  ED symptoms no  Immunizations needed no  Wt Readings from Last 6 Encounters:  11/09/19 : 225 lb 6.4 oz (102.2 kg)  0 245.0 150.0 - 450.0 10(3)uL    MCV 80.7 80.0 - 100.0 fL    MCH 23.9 (L) 26.0 - 34.0 pg    MCHC 29.7 (L) 31.0 - 37.0 g/dL    RDW 15.4 (H) 11.0 - 15.0 %    RDW-SD 44.5 35.1 - 46.3 fL    Neutrophil Absolute Prelim 1.54 1.50 - 7.70 x10 (3) uL    Neutrophil Abs [facility]CA ASC   • ESOPHAGOGASTRODUODENOSCOPY, POSSIBLE BIOPSY, POSSIBLE POLYPECTOMY 94103 N/A 9/16/2016    Performed by Eloisa Busby MD at [address] [place] [address]      Family History   Problem Relation Age of Onset   • Cancer Father       Social History:  Social Hi wheezes/ronchi/rales/crackles, normal air excursion  CARDIOVASCULAR: RRR, no murmur, no lower extremity edema, pedal and femoral pulses 2+ and symmetric b/l  GI: normoactive BS, non-distended, non-tender to palpation, no HSM/masses/pulsations  MUSCULOSKELE

## 2020-06-17 ENCOUNTER — TELEPHONE (OUTPATIENT)
Dept: FAMILY MEDICINE CLINIC | Facility: CLINIC | Age: 50
End: 2020-06-17

## 2020-06-17 DIAGNOSIS — D64.9 ANEMIA, UNSPECIFIED TYPE: Primary | ICD-10-CM

## 2020-06-17 NOTE — TELEPHONE ENCOUNTER
Spoke to pt, informed of results and MD recommendations below. Pt asked me to send information in a [email or web address] message.     [email or web address] message sent

## 2020-06-17 NOTE — TELEPHONE ENCOUNTER
Labs look good except he has mild anemia. Would recommend GI evaluation. Please refer to Dr. Tristan Ritchie who saw him before.

## 2020-07-14 PROCEDURE — 88305 TISSUE EXAM BY PATHOLOGIST: CPT | Performed by: INTERNAL MEDICINE

## 2020-09-28 RX ORDER — LOSARTAN POTASSIUM 50 MG/1
50 TABLET ORAL DAILY
Qty: 90 TABLET | Refills: 0 | Status: SHIPPED | OUTPATIENT
Start: 2020-09-28 | End: 2020-12-12

## 2020-09-28 RX ORDER — AMLODIPINE BESYLATE 10 MG/1
10 TABLET ORAL DAILY
Qty: 90 TABLET | Refills: 0 | Status: SHIPPED | OUTPATIENT
Start: 2020-09-28 | End: 2020-12-12

## 2020-09-28 NOTE — TELEPHONE ENCOUNTER
Name from pharmacy: Losartan Potassium 50mg Tablet          Will file in chart as: LOSARTAN POTASSIUM 50 MG Oral Tab    Sig: Take 1 tablet (50 mg total) by mouth daily.     Disp:  90 tablet    Refills:  0    Start: 9/26/2020    Class: Normal    Non-formular

## 2020-10-06 ENCOUNTER — TELEPHONE (OUTPATIENT)
Dept: FAMILY MEDICINE CLINIC | Facility: CLINIC | Age: 50
End: 2020-10-06

## 2020-10-06 DIAGNOSIS — D64.9 ANEMIA, UNSPECIFIED TYPE: Primary | ICD-10-CM

## 2020-10-06 DIAGNOSIS — E87.6 HYPOKALEMIA: ICD-10-CM

## 2020-10-06 NOTE — TELEPHONE ENCOUNTER
Lab orders placed. Attempted to reach pt, LMOM (ok per hipaa) informing orders placed and contact info for Central Scheduling left on vm.

## 2020-10-06 NOTE — TELEPHONE ENCOUNTER
Pt states that he was supposed to have a recheck on some labs to check his hemoglobin. Please place order for him to have this done and call him to let him know order has been placed.   Future Appointments   Date Time Provider John Lane   11/2/2020

## 2020-10-10 ENCOUNTER — LAB ENCOUNTER (OUTPATIENT)
Dept: LAB | Age: 50
End: 2020-10-10
Attending: FAMILY MEDICINE
Payer: COMMERCIAL

## 2020-10-10 DIAGNOSIS — D64.9 ANEMIA, UNSPECIFIED TYPE: ICD-10-CM

## 2020-10-10 DIAGNOSIS — E87.6 HYPOKALEMIA: ICD-10-CM

## 2020-10-10 PROCEDURE — 36415 COLL VENOUS BLD VENIPUNCTURE: CPT | Performed by: FAMILY MEDICINE

## 2020-10-10 PROCEDURE — 85025 COMPLETE CBC W/AUTO DIFF WBC: CPT | Performed by: FAMILY MEDICINE

## 2020-10-10 PROCEDURE — 80048 BASIC METABOLIC PNL TOTAL CA: CPT | Performed by: FAMILY MEDICINE

## 2020-12-12 ENCOUNTER — OFFICE VISIT (OUTPATIENT)
Dept: FAMILY MEDICINE CLINIC | Facility: CLINIC | Age: 50
End: 2020-12-12
Payer: COMMERCIAL

## 2020-12-12 ENCOUNTER — LAB ENCOUNTER (OUTPATIENT)
Dept: LAB | Age: 50
End: 2020-12-12
Attending: FAMILY MEDICINE
Payer: COMMERCIAL

## 2020-12-12 VITALS
TEMPERATURE: 98 F | SYSTOLIC BLOOD PRESSURE: 120 MMHG | WEIGHT: 228.5 LBS | BODY MASS INDEX: 31.99 KG/M2 | HEART RATE: 88 BPM | HEIGHT: 71 IN | DIASTOLIC BLOOD PRESSURE: 80 MMHG

## 2020-12-12 DIAGNOSIS — E87.6 LOW BLOOD POTASSIUM: ICD-10-CM

## 2020-12-12 DIAGNOSIS — D64.9 ANEMIA, UNSPECIFIED TYPE: Primary | ICD-10-CM

## 2020-12-12 DIAGNOSIS — R53.83 FATIGUE, UNSPECIFIED TYPE: ICD-10-CM

## 2020-12-12 DIAGNOSIS — D64.9 ANEMIA, UNSPECIFIED TYPE: ICD-10-CM

## 2020-12-12 DIAGNOSIS — I10 ESSENTIAL HYPERTENSION, BENIGN: ICD-10-CM

## 2020-12-12 PROCEDURE — 3074F SYST BP LT 130 MM HG: CPT | Performed by: FAMILY MEDICINE

## 2020-12-12 PROCEDURE — 3008F BODY MASS INDEX DOCD: CPT | Performed by: FAMILY MEDICINE

## 2020-12-12 PROCEDURE — 99214 OFFICE O/P EST MOD 30 MIN: CPT | Performed by: FAMILY MEDICINE

## 2020-12-12 PROCEDURE — 82746 ASSAY OF FOLIC ACID SERUM: CPT

## 2020-12-12 PROCEDURE — 80048 BASIC METABOLIC PNL TOTAL CA: CPT

## 2020-12-12 PROCEDURE — 36415 COLL VENOUS BLD VENIPUNCTURE: CPT

## 2020-12-12 PROCEDURE — 82607 VITAMIN B-12: CPT

## 2020-12-12 PROCEDURE — 3079F DIAST BP 80-89 MM HG: CPT | Performed by: FAMILY MEDICINE

## 2020-12-12 RX ORDER — UBIDECARENONE 75 MG
250 CAPSULE ORAL DAILY
COMMUNITY

## 2020-12-12 RX ORDER — AMLODIPINE BESYLATE 10 MG/1
10 TABLET ORAL DAILY
Qty: 90 TABLET | Refills: 1 | Status: SHIPPED | OUTPATIENT
Start: 2020-12-12 | End: 2021-06-23

## 2020-12-12 RX ORDER — LOSARTAN POTASSIUM 50 MG/1
50 TABLET ORAL DAILY
Qty: 90 TABLET | Refills: 1 | Status: SHIPPED | OUTPATIENT
Start: 2020-12-12 | End: 2021-06-23

## 2020-12-12 NOTE — PROGRESS NOTES
HPI:   Tanesha Abarca is a 53year old male that presents for follow-up of high blood pressure, low potassium level, anemia, fatigue. Patient states that he was taking B12 supplement and it really did increase his energy level as he did have fatigue. encounter: 228 lb 8 oz (103.6 kg). Vital signs reviewed. Appears stated age, well groomed. Physical Exam:  GEN:  Patient is alert, awake and oriented, well developed, well nourished, no apparent distress.   HEENT:     Head:  Normocephalic, atraumatic    E

## 2020-12-18 DIAGNOSIS — E87.6 HYPOKALEMIA: Primary | ICD-10-CM

## 2021-01-25 ENCOUNTER — TELEPHONE (OUTPATIENT)
Dept: FAMILY MEDICINE CLINIC | Facility: CLINIC | Age: 51
End: 2021-01-25

## 2021-01-25 NOTE — TELEPHONE ENCOUNTER
Pt states he did see the result note but states he did not fully understand. Reviewed lab results and recommnedations with pt, all of pt's current questions answered.  Pt verbalizes understanding to have labs repeated, pt states he has been intermittently t

## 2021-06-23 RX ORDER — AMLODIPINE BESYLATE 10 MG/1
TABLET ORAL
Qty: 90 TABLET | Refills: 0 | Status: SHIPPED | OUTPATIENT
Start: 2021-06-23 | End: 2021-07-12

## 2021-06-23 RX ORDER — LOSARTAN POTASSIUM 50 MG/1
TABLET ORAL
Qty: 90 TABLET | Refills: 0 | Status: SHIPPED | OUTPATIENT
Start: 2021-06-23 | End: 2021-07-12

## 2021-06-23 NOTE — TELEPHONE ENCOUNTER
Requested Prescriptions     Name from pharmacy: Amlodipine Besylate 10mg Tablet         Will file in chart as: AMLODIPINE BESYLATE 10 MG Oral Tab    Sig: Take 1 tablet by mouth daily.     Disp:  90 tablet    Refills:  1 (Pharmacy requested: Not specified)

## 2021-07-12 ENCOUNTER — OFFICE VISIT (OUTPATIENT)
Dept: FAMILY MEDICINE CLINIC | Facility: CLINIC | Age: 51
End: 2021-07-12
Payer: COMMERCIAL

## 2021-07-12 ENCOUNTER — LAB ENCOUNTER (OUTPATIENT)
Dept: LAB | Age: 51
End: 2021-07-12
Attending: FAMILY MEDICINE
Payer: COMMERCIAL

## 2021-07-12 VITALS
DIASTOLIC BLOOD PRESSURE: 80 MMHG | WEIGHT: 226 LBS | BODY MASS INDEX: 31.64 KG/M2 | SYSTOLIC BLOOD PRESSURE: 110 MMHG | OXYGEN SATURATION: 100 % | RESPIRATION RATE: 16 BRPM | TEMPERATURE: 98 F | HEIGHT: 71 IN | HEART RATE: 84 BPM

## 2021-07-12 DIAGNOSIS — Z00.00 ROUTINE ADULT HEALTH MAINTENANCE: Primary | ICD-10-CM

## 2021-07-12 DIAGNOSIS — Z00.00 ROUTINE ADULT HEALTH MAINTENANCE: ICD-10-CM

## 2021-07-12 DIAGNOSIS — E55.9 VITAMIN D DEFICIENCY: ICD-10-CM

## 2021-07-12 LAB
ALBUMIN SERPL-MCNC: 4 G/DL (ref 3.4–5)
ALBUMIN/GLOB SERPL: 1 {RATIO} (ref 1–2)
ALP LIVER SERPL-CCNC: 113 U/L
ALT SERPL-CCNC: 36 U/L
ANION GAP SERPL CALC-SCNC: 6 MMOL/L (ref 0–18)
AST SERPL-CCNC: 22 U/L (ref 15–37)
BILIRUB SERPL-MCNC: 0.6 MG/DL (ref 0.1–2)
BUN BLD-MCNC: 11 MG/DL (ref 7–18)
BUN/CREAT SERPL: 10 (ref 10–20)
CALCIUM BLD-MCNC: 8.6 MG/DL (ref 8.5–10.1)
CHLORIDE SERPL-SCNC: 108 MMOL/L (ref 98–112)
CHOLEST SMN-MCNC: 126 MG/DL (ref ?–200)
CO2 SERPL-SCNC: 28 MMOL/L (ref 21–32)
CREAT BLD-MCNC: 1.1 MG/DL
DEPRECATED RDW RBC AUTO: 44.7 FL (ref 35.1–46.3)
ERYTHROCYTE [DISTWIDTH] IN BLOOD BY AUTOMATED COUNT: 15.8 % (ref 11–15)
GLOBULIN PLAS-MCNC: 3.9 G/DL (ref 2.8–4.4)
GLUCOSE BLD-MCNC: 89 MG/DL (ref 70–99)
HCT VFR BLD AUTO: 43.7 %
HDLC SERPL-MCNC: 44 MG/DL (ref 40–59)
HGB BLD-MCNC: 13.3 G/DL
LDLC SERPL CALC-MCNC: 71 MG/DL (ref ?–100)
M PROTEIN MFR SERPL ELPH: 7.9 G/DL (ref 6.4–8.2)
MCH RBC QN AUTO: 24.3 PG (ref 26–34)
MCHC RBC AUTO-ENTMCNC: 30.4 G/DL (ref 31–37)
MCV RBC AUTO: 79.9 FL
NONHDLC SERPL-MCNC: 82 MG/DL (ref ?–130)
OSMOLALITY SERPL CALC.SUM OF ELEC: 293 MOSM/KG (ref 275–295)
PATIENT FASTING Y/N/NP: YES
PATIENT FASTING Y/N/NP: YES
PLATELET # BLD AUTO: 293 10(3)UL (ref 150–450)
POTASSIUM SERPL-SCNC: 3.2 MMOL/L (ref 3.5–5.1)
PSA SERPL-MCNC: 0.58 NG/ML (ref ?–4)
RBC # BLD AUTO: 5.47 X10(6)UL
SODIUM SERPL-SCNC: 142 MMOL/L (ref 136–145)
T4 FREE SERPL-MCNC: 1 NG/DL (ref 0.8–1.7)
TRIGL SERPL-MCNC: 50 MG/DL (ref 30–149)
TSI SER-ACNC: 1.54 MIU/ML (ref 0.36–3.74)
VIT D+METAB SERPL-MCNC: 37.8 NG/ML (ref 30–100)
VLDLC SERPL CALC-MCNC: 8 MG/DL (ref 0–30)
WBC # BLD AUTO: 4.4 X10(3) UL (ref 4–11)

## 2021-07-12 PROCEDURE — 80053 COMPREHEN METABOLIC PANEL: CPT

## 2021-07-12 PROCEDURE — 84443 ASSAY THYROID STIM HORMONE: CPT

## 2021-07-12 PROCEDURE — 82306 VITAMIN D 25 HYDROXY: CPT

## 2021-07-12 PROCEDURE — 84153 ASSAY OF PSA TOTAL: CPT

## 2021-07-12 PROCEDURE — 36415 COLL VENOUS BLD VENIPUNCTURE: CPT

## 2021-07-12 PROCEDURE — 3074F SYST BP LT 130 MM HG: CPT | Performed by: FAMILY MEDICINE

## 2021-07-12 PROCEDURE — 3079F DIAST BP 80-89 MM HG: CPT | Performed by: FAMILY MEDICINE

## 2021-07-12 PROCEDURE — 85027 COMPLETE CBC AUTOMATED: CPT

## 2021-07-12 PROCEDURE — 3008F BODY MASS INDEX DOCD: CPT | Performed by: FAMILY MEDICINE

## 2021-07-12 PROCEDURE — 84439 ASSAY OF FREE THYROXINE: CPT

## 2021-07-12 PROCEDURE — 99396 PREV VISIT EST AGE 40-64: CPT | Performed by: FAMILY MEDICINE

## 2021-07-12 PROCEDURE — 80061 LIPID PANEL: CPT

## 2021-07-12 RX ORDER — AMLODIPINE BESYLATE 10 MG/1
10 TABLET ORAL DAILY
Qty: 90 TABLET | Refills: 3 | Status: SHIPPED | OUTPATIENT
Start: 2021-07-12

## 2021-07-12 RX ORDER — LOSARTAN POTASSIUM 50 MG/1
50 TABLET ORAL DAILY
Qty: 90 TABLET | Refills: 3 | Status: SHIPPED | OUTPATIENT
Start: 2021-07-12

## 2021-07-13 ENCOUNTER — TELEPHONE (OUTPATIENT)
Dept: FAMILY MEDICINE CLINIC | Facility: CLINIC | Age: 51
End: 2021-07-13

## 2021-07-13 DIAGNOSIS — E87.6 LOW BLOOD POTASSIUM: ICD-10-CM

## 2021-07-13 RX ORDER — POTASSIUM CHLORIDE 1500 MG/1
20 TABLET, FILM COATED, EXTENDED RELEASE ORAL DAILY
Qty: 7 TABLET | Refills: 0 | Status: SHIPPED | OUTPATIENT
Start: 2021-07-13 | End: 2021-07-20

## 2021-07-13 NOTE — TELEPHONE ENCOUNTER
- Pt would like a call with lab results when available. Please call [phone]    Also can rx be re routed to pillpack for medication refill? Potassium Chloride ER 20 MEQ Oral Tab CR 7 tablet 0 7/13/2021 7/20/2021    Sig - Route:  Take 20 m

## 2021-07-13 NOTE — TELEPHONE ENCOUNTER
Spoke to pt, informed of lab results. See Result Note. Pt asked if Rx could be resent to [facility] in [place]. Rx resent. Pt verbalized understanding and agreement. All questions answered.

## 2021-07-23 ENCOUNTER — IMMUNIZATION (OUTPATIENT)
Dept: LAB | Facility: HOSPITAL | Age: 51
End: 2021-07-23
Attending: EMERGENCY MEDICINE
Payer: COMMERCIAL

## 2021-07-23 DIAGNOSIS — Z23 NEED FOR VACCINATION: Primary | ICD-10-CM

## 2021-07-23 PROCEDURE — 0001A SARSCOV2 VAC 30MCG/0.3ML IM: CPT

## 2021-08-07 ENCOUNTER — LAB ENCOUNTER (OUTPATIENT)
Dept: LAB | Age: 51
End: 2021-08-07
Attending: FAMILY MEDICINE
Payer: COMMERCIAL

## 2021-08-07 DIAGNOSIS — E87.6 LOW BLOOD POTASSIUM: ICD-10-CM

## 2021-08-07 DIAGNOSIS — E87.6 HYPOKALEMIA: ICD-10-CM

## 2021-08-07 LAB
ALBUMIN SERPL-MCNC: 3.6 G/DL (ref 3.4–5)
ALBUMIN/GLOB SERPL: 1 {RATIO} (ref 1–2)
ALP LIVER SERPL-CCNC: 110 U/L
ALT SERPL-CCNC: 39 U/L
ANION GAP SERPL CALC-SCNC: 1 MMOL/L (ref 0–18)
AST SERPL-CCNC: 25 U/L (ref 15–37)
BILIRUB SERPL-MCNC: 0.4 MG/DL (ref 0.1–2)
BUN BLD-MCNC: 11 MG/DL (ref 7–18)
CALCIUM BLD-MCNC: 8.8 MG/DL (ref 8.5–10.1)
CHLORIDE SERPL-SCNC: 108 MMOL/L (ref 98–112)
CO2 SERPL-SCNC: 33 MMOL/L (ref 21–32)
CREAT BLD-MCNC: 1.34 MG/DL
GLOBULIN PLAS-MCNC: 3.7 G/DL (ref 2.8–4.4)
GLUCOSE BLD-MCNC: 101 MG/DL (ref 70–99)
M PROTEIN MFR SERPL ELPH: 7.3 G/DL (ref 6.4–8.2)
OSMOLALITY SERPL CALC.SUM OF ELEC: 294 MOSM/KG (ref 275–295)
PATIENT FASTING Y/N/NP: NO
POTASSIUM SERPL-SCNC: 3.3 MMOL/L (ref 3.5–5.1)
SODIUM SERPL-SCNC: 142 MMOL/L (ref 136–145)

## 2021-08-07 PROCEDURE — 80053 COMPREHEN METABOLIC PANEL: CPT | Performed by: FAMILY MEDICINE

## 2021-08-13 ENCOUNTER — IMMUNIZATION (OUTPATIENT)
Dept: LAB | Facility: HOSPITAL | Age: 51
End: 2021-08-13
Attending: EMERGENCY MEDICINE
Payer: COMMERCIAL

## 2021-08-13 DIAGNOSIS — Z23 NEED FOR VACCINATION: Primary | ICD-10-CM

## 2021-08-13 PROCEDURE — 0002A SARSCOV2 VAC 30MCG/0.3ML IM: CPT

## 2021-12-03 ENCOUNTER — OFFICE VISIT (OUTPATIENT)
Dept: FAMILY MEDICINE CLINIC | Facility: CLINIC | Age: 51
End: 2021-12-03
Payer: COMMERCIAL

## 2021-12-03 VITALS
OXYGEN SATURATION: 98 % | HEART RATE: 88 BPM | DIASTOLIC BLOOD PRESSURE: 84 MMHG | SYSTOLIC BLOOD PRESSURE: 142 MMHG | TEMPERATURE: 97 F | RESPIRATION RATE: 20 BRPM

## 2021-12-03 DIAGNOSIS — R05.9 COUGH: ICD-10-CM

## 2021-12-03 DIAGNOSIS — J01.40 ACUTE PANSINUSITIS, RECURRENCE NOT SPECIFIED: ICD-10-CM

## 2021-12-03 DIAGNOSIS — Z20.822 SUSPECTED COVID-19 VIRUS INFECTION: Primary | ICD-10-CM

## 2021-12-03 PROCEDURE — 99213 OFFICE O/P EST LOW 20 MIN: CPT | Performed by: NURSE PRACTITIONER

## 2021-12-03 PROCEDURE — 3079F DIAST BP 80-89 MM HG: CPT | Performed by: NURSE PRACTITIONER

## 2021-12-03 PROCEDURE — 3077F SYST BP >= 140 MM HG: CPT | Performed by: NURSE PRACTITIONER

## 2021-12-03 RX ORDER — BENZONATATE 200 MG/1
200 CAPSULE ORAL 3 TIMES DAILY PRN
Qty: 30 CAPSULE | Refills: 0 | Status: SHIPPED | OUTPATIENT
Start: 2021-12-03 | End: 2021-12-26 | Stop reason: ALTCHOICE

## 2021-12-03 RX ORDER — AMOXICILLIN AND CLAVULANATE POTASSIUM 875; 125 MG/1; MG/1
1 TABLET, FILM COATED ORAL 2 TIMES DAILY
Qty: 20 TABLET | Refills: 0 | Status: SHIPPED | OUTPATIENT
Start: 2021-12-03 | End: 2021-12-13

## 2021-12-03 NOTE — PROGRESS NOTES
CHIEF COMPLAINT:   Patient presents with:  Cold: Entered by patient  Sinus Problem: x5 days      HPI:   Milan Schaumann is a 49year old male who presents for sinus congestion/cough/body aches for  5  days. Symptoms have been worsening since onset.  Sinus Uncontrolled       Past Surgical History:   Procedure Laterality Date   • EGD N/A 7/14/2020    Procedure: ESOPHAGOGASTRODUODENOSCOPY, COLONOSCOPY, POSSIBLE BIOPSY, POSSIBLE POLYPECTOMY 12418, 88795;  Surgeon: Robert Merino MD;  Location: [place] [address] specified  Cough    PLAN:  Start cough medication. Hold abx until covid results known. If symptoms continue to worse and covid test negative, ok to start abx. Meds as below. Mucinex to help thin secretions.   Increase fluids and rest.  Comfort care as desc

## 2021-12-06 NOTE — PROGRESS NOTES
Tanesha Abarca is a 49year old male who presents for a complete physical exam.   HPI:   Pt complains of nothing.   Urination changes no  ED symptoms no  Immunizations needed no  Wt Readings from Last 6 Encounters:  07/12/21 : 226 lb (102.5 kg)  12/12/20 (ONE-A-DAY MENS) Oral Tab Take 1 tablet by mouth daily. • Potassium Chloride ER 20 MEQ Oral Tab CR Take 20 mEq by mouth daily for 7 days.  7 tablet 0      Past Medical History:   Diagnosis Date   • Essential hypertension    • Essential hypertension, ambrocio well developed, normal voice  SKIN: no rashes, no suspicious moles or lesions  HENT: NCAT, EACs clear b/l, TMs normal b/l, nasal turbinates normal b/l, oropharynx with mmm/clear/normal, gingiva normal, lips normal  EYES: PERRLA, EOMI, conjunctiva non-injec Potassium 50 MG Oral Tab; Take 1 tablet (50 mg total) by mouth daily. Pt educated on immunizations and health maintenance appropriate for age. The patient verbalizes understanding of these recommendations and agrees to the plan.     The patient No

## 2021-12-26 ENCOUNTER — OFFICE VISIT (OUTPATIENT)
Dept: FAMILY MEDICINE CLINIC | Facility: CLINIC | Age: 51
End: 2021-12-26
Payer: COMMERCIAL

## 2021-12-26 VITALS
HEIGHT: 71 IN | SYSTOLIC BLOOD PRESSURE: 136 MMHG | HEART RATE: 104 BPM | TEMPERATURE: 98 F | DIASTOLIC BLOOD PRESSURE: 88 MMHG | BODY MASS INDEX: 31.5 KG/M2 | WEIGHT: 225 LBS | OXYGEN SATURATION: 100 % | RESPIRATION RATE: 15 BRPM

## 2021-12-26 DIAGNOSIS — Z20.822 EXPOSURE TO COVID-19 VIRUS: Primary | ICD-10-CM

## 2021-12-26 PROCEDURE — 3008F BODY MASS INDEX DOCD: CPT | Performed by: PHYSICIAN ASSISTANT

## 2021-12-26 PROCEDURE — 3075F SYST BP GE 130 - 139MM HG: CPT | Performed by: PHYSICIAN ASSISTANT

## 2021-12-26 PROCEDURE — 3079F DIAST BP 80-89 MM HG: CPT | Performed by: PHYSICIAN ASSISTANT

## 2021-12-26 PROCEDURE — 99212 OFFICE O/P EST SF 10 MIN: CPT | Performed by: PHYSICIAN ASSISTANT

## 2021-12-26 NOTE — PROGRESS NOTES
CHIEF COMPLAINT:   Patient presents with:  Covid-19 Test      HPI:   Milan Schaumann is a 49year old male who presents for Covid testing. Patient reports exposure 4 days ago. Patient is vaccinated for covid.  Patient denies any symptoms of COVID includ GENERAL: Normal appetite  SKIN: no rashes or abnormal skin lesions  HEENT: See HPI  LUNGS: denies shortness of breath or wheezing, See HPI  CARDIOVASCULAR: denies chest pain or palpitations   GI: denies N/V/C or abdominal pain  NEURO: Denies headaches and communities. As concerns arise about the new strain of coronavirus that causes COVID-19, [facility] is here to provide community members reliable answers to any questions they may have.     Please review the entirety of this informational do should  • Watch for symptoms until 14 days after exposure. • If you have symptoms, immediately self-isolate and contact your local public health authority or healthcare provider.   • Wear a mask, stay at least 6 feet from others, wash your hands, avoid  weakness, difficult breathing, or unrelenting fevers greater than 100.4 degrees Fahrenheit, you should contact your health care provider before seeking further care. Process measures to keep everyone safe in this difficult time are changing frequently.  [place] within 2 days of your discharge to arrange for a telehealth follow-up.  CDC does not recommend repeat testing after a positive test.  Convalescent Plasma Donation Program  [employer], in conjunction with [employer]so Morel., is looking for patients wh [email or web address]. pdf  [email or web address]  http://www.[facility].[place].gov/topics-services/diseases-and-conditions/dise https://health.[facility]/coronavirus/covid-19-information/covid-19-long-haulers. html  Long-term effects of covid-19. (n.d.).  Retrieved May 11, 2021, from [employer].  What it means to be A Coronavirus

## 2021-12-26 NOTE — PATIENT INSTRUCTIONS
Coronavirus Disease 2019 (COVID-19)     [facility] is committed to the safety and well-being of our patients, members, employees, and communities.  As concerns arise about the new strain of coronavirus that causes COVID-19, [facility] exposure  • After day 7 from date of last exposure with a negative test result (test must occur on day 5 or later)  After stopping quarantine, you should  • Watch for symptoms until 14 days after exposure.   • If you have symptoms, immediately self-isolate Care     If you are awaiting test results or are confirmed positive for COVID -19, and your symptoms worsen at home with symptoms such as: extreme weakness, difficult breathing, or unrelenting fevers greater than 100.4 degrees Fahrenheit, you should contac Follow-up  If you are diagnosed with COVID, refrain from exercise until approved by your primary care provider. Please call your primary care provider within 2 days of your discharge to arrange for a telehealth follow-up.  CDC does not recommend repeat test Control & Prevention (CDC)  10 things you can do to manage your health at home, Lalita.nl. pdf  [employer] Retrieved March 17, 2021, from https://health.[facility]/coronavirus/covid-19-information/covid-19-long-haulers. html  Long-term effects of covid-19. (n.d.).  Retrieved May 11, 2021, from [employer].[facility]

## 2022-01-11 ENCOUNTER — TELEPHONE (OUTPATIENT)
Dept: FAMILY MEDICINE CLINIC | Facility: CLINIC | Age: 52
End: 2022-01-11

## 2022-01-11 DIAGNOSIS — R05.9 COUGH: ICD-10-CM

## 2022-01-11 RX ORDER — BENZONATATE 200 MG/1
200 CAPSULE ORAL 3 TIMES DAILY PRN
Qty: 30 CAPSULE | Refills: 0 | OUTPATIENT
Start: 2022-01-11

## 2022-01-11 NOTE — TELEPHONE ENCOUNTER
Recd fax from [facility] for refill of the Benzonatate 200mg capsules for quantity of 30. Note on the fax that the patient is requesting refills from Dr. Marlin Moss, instead of Regino Coates.

## 2022-01-11 NOTE — TELEPHONE ENCOUNTER
- benzonatate 200 MG Oral Cap    · Pts refill request was denied  · Pt would like to speak to a nurse

## 2022-01-12 ENCOUNTER — VIRTUAL PHONE E/M (OUTPATIENT)
Dept: FAMILY MEDICINE CLINIC | Facility: CLINIC | Age: 52
End: 2022-01-12
Payer: COMMERCIAL

## 2022-01-12 DIAGNOSIS — U07.1 COVID-19 VIRUS INFECTION: Primary | ICD-10-CM

## 2022-01-12 PROCEDURE — 99442 PHONE E/M BY PHYS 11-20 MIN: CPT | Performed by: FAMILY MEDICINE

## 2022-01-12 RX ORDER — BENZONATATE 200 MG/1
200 CAPSULE ORAL 3 TIMES DAILY PRN
Qty: 21 CAPSULE | Refills: 0 | Status: SHIPPED | OUTPATIENT
Start: 2022-01-12

## 2022-01-12 NOTE — PROGRESS NOTES
TELEMEDICINE VISIT by phone  This visit is conducted using Telemedicine with live, interactive audio    Verification of patient identity was established by the  patient (s)  Quang Worrell verbally consents to a telemedic mouth daily. (Patient not taking: Reported on 12/26/2021), Disp: , Rfl:   •  B Complex Vitamins (VITAMIN B COMPLEX OR), Take 1 tablet by mouth daily.  (Patient not taking: Reported on 12/26/2021), Disp: , Rfl:   •  acetaminophen 500 MG Oral Tab, Take 500 mg interest of the provider-patient relationship, due to the ongoing public health crisis/national emergency and because of restrictions of visitation. There are limitations of this visit as no physical exam could be performed.   Every conscious effort was ta

## 2022-01-12 NOTE — TELEPHONE ENCOUNTER
Advised pt to schedule a TV with Dr. Shayla Eduardo for Covid follow up and for medication refill as Dr. Shayla Eduardo has not prescribed this for pt. Pt verbalized understanding and appointment scheduled.    Future Appointments   Date Time Provider John Lane   1/

## 2022-09-04 DIAGNOSIS — I10 ESSENTIAL HYPERTENSION, BENIGN: Primary | ICD-10-CM

## 2022-09-04 DIAGNOSIS — Z00.00 ROUTINE ADULT HEALTH MAINTENANCE: ICD-10-CM

## 2022-09-04 DIAGNOSIS — R53.83 FATIGUE, UNSPECIFIED TYPE: ICD-10-CM

## 2022-09-07 RX ORDER — LOSARTAN POTASSIUM 50 MG/1
TABLET ORAL
Qty: 90 TABLET | Refills: 0 | Status: SHIPPED | OUTPATIENT
Start: 2022-09-07

## 2022-09-07 RX ORDER — AMLODIPINE BESYLATE 10 MG/1
TABLET ORAL
Qty: 90 TABLET | Refills: 0 | Status: SHIPPED | OUTPATIENT
Start: 2022-09-07

## 2022-09-07 NOTE — TELEPHONE ENCOUNTER
Pt will need a refill prior to upcoming appt. Future Appointments   Date Time Provider John Lane   9/19/2022 10:40 AM Edna Madden MD [facility] [address] [facility] [address]     He will also have his labs done.

## 2022-09-10 ENCOUNTER — LAB ENCOUNTER (OUTPATIENT)
Dept: LAB | Age: 52
End: 2022-09-10
Attending: FAMILY MEDICINE
Payer: COMMERCIAL

## 2022-09-10 DIAGNOSIS — I10 ESSENTIAL HYPERTENSION, BENIGN: ICD-10-CM

## 2022-09-10 DIAGNOSIS — Z00.00 ROUTINE ADULT HEALTH MAINTENANCE: ICD-10-CM

## 2022-09-10 DIAGNOSIS — R53.83 FATIGUE, UNSPECIFIED TYPE: ICD-10-CM

## 2022-09-10 LAB
ALBUMIN SERPL-MCNC: 3.6 G/DL (ref 3.4–5)
ALBUMIN/GLOB SERPL: 0.9 {RATIO} (ref 1–2)
ALP LIVER SERPL-CCNC: 116 U/L
ALT SERPL-CCNC: 70 U/L
ANION GAP SERPL CALC-SCNC: 5 MMOL/L (ref 0–18)
AST SERPL-CCNC: 43 U/L (ref 15–37)
BASOPHILS # BLD AUTO: 0.03 X10(3) UL (ref 0–0.2)
BASOPHILS NFR BLD AUTO: 0.7 %
BILIRUB SERPL-MCNC: 0.6 MG/DL (ref 0.1–2)
BUN BLD-MCNC: 12 MG/DL (ref 7–18)
CALCIUM BLD-MCNC: 9.1 MG/DL (ref 8.5–10.1)
CHLORIDE SERPL-SCNC: 110 MMOL/L (ref 98–112)
CHOLEST SERPL-MCNC: 115 MG/DL (ref ?–200)
CO2 SERPL-SCNC: 28 MMOL/L (ref 21–32)
CREAT BLD-MCNC: 1.11 MG/DL
EOSINOPHIL # BLD AUTO: 0.11 X10(3) UL (ref 0–0.7)
EOSINOPHIL NFR BLD AUTO: 2.6 %
ERYTHROCYTE [DISTWIDTH] IN BLOOD BY AUTOMATED COUNT: 15.4 %
FASTING PATIENT LIPID ANSWER: YES
FASTING STATUS PATIENT QL REPORTED: YES
GFR SERPLBLD BASED ON 1.73 SQ M-ARVRAT: 80 ML/MIN/1.73M2 (ref 60–?)
GLOBULIN PLAS-MCNC: 3.9 G/DL (ref 2.8–4.4)
GLUCOSE BLD-MCNC: 100 MG/DL (ref 70–99)
HCT VFR BLD AUTO: 43.4 %
HDLC SERPL-MCNC: 41 MG/DL (ref 40–59)
HGB BLD-MCNC: 13.2 G/DL
IMM GRANULOCYTES # BLD AUTO: 0.01 X10(3) UL (ref 0–1)
IMM GRANULOCYTES NFR BLD: 0.2 %
LDLC SERPL CALC-MCNC: 62 MG/DL (ref ?–100)
LYMPHOCYTES # BLD AUTO: 2.06 X10(3) UL (ref 1–4)
LYMPHOCYTES NFR BLD AUTO: 48.8 %
MCH RBC QN AUTO: 24.9 PG (ref 26–34)
MCHC RBC AUTO-ENTMCNC: 30.4 G/DL (ref 31–37)
MCV RBC AUTO: 81.7 FL
MONOCYTES # BLD AUTO: 0.54 X10(3) UL (ref 0.1–1)
MONOCYTES NFR BLD AUTO: 12.8 %
NEUTROPHILS # BLD AUTO: 1.47 X10 (3) UL (ref 1.5–7.7)
NEUTROPHILS # BLD AUTO: 1.47 X10(3) UL (ref 1.5–7.7)
NEUTROPHILS NFR BLD AUTO: 34.9 %
NONHDLC SERPL-MCNC: 74 MG/DL (ref ?–130)
OSMOLALITY SERPL CALC.SUM OF ELEC: 296 MOSM/KG (ref 275–295)
PLATELET # BLD AUTO: 242 10(3)UL (ref 150–450)
POTASSIUM SERPL-SCNC: 3.6 MMOL/L (ref 3.5–5.1)
PROT SERPL-MCNC: 7.5 G/DL (ref 6.4–8.2)
RBC # BLD AUTO: 5.31 X10(6)UL
SODIUM SERPL-SCNC: 143 MMOL/L (ref 136–145)
T4 FREE SERPL-MCNC: 1.1 NG/DL (ref 0.8–1.7)
TRIGL SERPL-MCNC: 50 MG/DL (ref 30–149)
TSI SER-ACNC: 0.92 MIU/ML (ref 0.36–3.74)
VLDLC SERPL CALC-MCNC: 7 MG/DL (ref 0–30)
WBC # BLD AUTO: 4.2 X10(3) UL (ref 4–11)

## 2022-09-10 PROCEDURE — 80050 GENERAL HEALTH PANEL: CPT | Performed by: FAMILY MEDICINE

## 2022-09-10 PROCEDURE — 80061 LIPID PANEL: CPT | Performed by: FAMILY MEDICINE

## 2022-09-10 PROCEDURE — 84439 ASSAY OF FREE THYROXINE: CPT | Performed by: FAMILY MEDICINE

## 2022-09-19 ENCOUNTER — LAB ENCOUNTER (OUTPATIENT)
Dept: LAB | Age: 52
End: 2022-09-19
Attending: FAMILY MEDICINE

## 2022-09-19 ENCOUNTER — OFFICE VISIT (OUTPATIENT)
Dept: FAMILY MEDICINE CLINIC | Facility: CLINIC | Age: 52
End: 2022-09-19
Payer: COMMERCIAL

## 2022-09-19 VITALS
SYSTOLIC BLOOD PRESSURE: 126 MMHG | HEIGHT: 71 IN | HEART RATE: 72 BPM | OXYGEN SATURATION: 98 % | TEMPERATURE: 97 F | WEIGHT: 234.13 LBS | RESPIRATION RATE: 16 BRPM | BODY MASS INDEX: 32.78 KG/M2 | DIASTOLIC BLOOD PRESSURE: 80 MMHG

## 2022-09-19 DIAGNOSIS — Z00.00 ROUTINE ADULT HEALTH MAINTENANCE: Primary | ICD-10-CM

## 2022-09-19 DIAGNOSIS — R74.8 ELEVATED LIVER ENZYMES: ICD-10-CM

## 2022-09-19 DIAGNOSIS — Z00.00 ROUTINE ADULT HEALTH MAINTENANCE: ICD-10-CM

## 2022-09-19 DIAGNOSIS — I10 ESSENTIAL HYPERTENSION, BENIGN: ICD-10-CM

## 2022-09-19 LAB — COMPLEXED PSA SERPL-MCNC: 0.82 NG/ML (ref ?–4)

## 2022-09-19 PROCEDURE — 36415 COLL VENOUS BLD VENIPUNCTURE: CPT

## 2022-09-19 PROCEDURE — 99396 PREV VISIT EST AGE 40-64: CPT | Performed by: FAMILY MEDICINE

## 2022-09-19 PROCEDURE — 3074F SYST BP LT 130 MM HG: CPT | Performed by: FAMILY MEDICINE

## 2022-09-19 PROCEDURE — 3079F DIAST BP 80-89 MM HG: CPT | Performed by: FAMILY MEDICINE

## 2022-09-19 PROCEDURE — 3008F BODY MASS INDEX DOCD: CPT | Performed by: FAMILY MEDICINE

## 2022-09-19 RX ORDER — LOSARTAN POTASSIUM 50 MG/1
50 TABLET ORAL DAILY
Qty: 90 TABLET | Refills: 1 | Status: SHIPPED | OUTPATIENT
Start: 2022-09-19

## 2022-09-19 RX ORDER — AMLODIPINE BESYLATE 10 MG/1
10 TABLET ORAL DAILY
Qty: 90 TABLET | Refills: 1 | Status: SHIPPED | OUTPATIENT
Start: 2022-09-19

## 2022-10-16 ENCOUNTER — HOSPITAL ENCOUNTER (OUTPATIENT)
Dept: ULTRASOUND IMAGING | Age: 52
Discharge: HOME | End: 2022-10-16
Attending: FAMILY MEDICINE
Payer: COMMERCIAL

## 2022-10-16 ENCOUNTER — HOSPITAL ENCOUNTER (OUTPATIENT)
Dept: ULTRASOUND IMAGING | Age: 52
End: 2022-10-16
Attending: FAMILY MEDICINE
Payer: COMMERCIAL

## 2022-10-16 DIAGNOSIS — R74.8 ELEVATED LIVER ENZYMES: ICD-10-CM

## 2022-10-16 PROCEDURE — 76700 US EXAM ABDOM COMPLETE: CPT | Performed by: FAMILY MEDICINE

## 2022-10-17 ENCOUNTER — TELEPHONE (OUTPATIENT)
Dept: FAMILY MEDICINE CLINIC | Facility: CLINIC | Age: 52
End: 2022-10-17

## 2022-10-17 NOTE — TELEPHONE ENCOUNTER
Pt called and said he got results for his ultrasound in his MyChart but he would like someone to interpret the results to him.

## 2022-10-18 DIAGNOSIS — R74.8 ELEVATED LIVER ENZYMES: Primary | ICD-10-CM

## 2022-12-09 ENCOUNTER — LAB ENCOUNTER (OUTPATIENT)
Dept: LAB | Age: 52
End: 2022-12-09
Attending: FAMILY MEDICINE
Payer: COMMERCIAL

## 2022-12-09 DIAGNOSIS — R74.8 ELEVATED LIVER ENZYMES: ICD-10-CM

## 2022-12-09 LAB
ALBUMIN SERPL-MCNC: 3.6 G/DL (ref 3.4–5)
ALBUMIN/GLOB SERPL: 1 {RATIO} (ref 1–2)
ALP LIVER SERPL-CCNC: 109 U/L
ALT SERPL-CCNC: 69 U/L
ANION GAP SERPL CALC-SCNC: 7 MMOL/L (ref 0–18)
AST SERPL-CCNC: 38 U/L (ref 15–37)
BILIRUB SERPL-MCNC: 0.5 MG/DL (ref 0.1–2)
BUN BLD-MCNC: 10 MG/DL (ref 7–18)
CALCIUM BLD-MCNC: 9.1 MG/DL (ref 8.5–10.1)
CHLORIDE SERPL-SCNC: 108 MMOL/L (ref 98–112)
CO2 SERPL-SCNC: 30 MMOL/L (ref 21–32)
CREAT BLD-MCNC: 1 MG/DL
FASTING STATUS PATIENT QL REPORTED: NO
GFR SERPLBLD BASED ON 1.73 SQ M-ARVRAT: 91 ML/MIN/1.73M2 (ref 60–?)
GLOBULIN PLAS-MCNC: 3.7 G/DL (ref 2.8–4.4)
GLUCOSE BLD-MCNC: 98 MG/DL (ref 70–99)
OSMOLALITY SERPL CALC.SUM OF ELEC: 299 MOSM/KG (ref 275–295)
POTASSIUM SERPL-SCNC: 3.5 MMOL/L (ref 3.5–5.1)
PROT SERPL-MCNC: 7.3 G/DL (ref 6.4–8.2)
SODIUM SERPL-SCNC: 145 MMOL/L (ref 136–145)

## 2022-12-09 PROCEDURE — 80053 COMPREHEN METABOLIC PANEL: CPT | Performed by: FAMILY MEDICINE

## 2023-04-05 DIAGNOSIS — I10 ESSENTIAL HYPERTENSION, BENIGN: ICD-10-CM

## 2023-04-05 NOTE — TELEPHONE ENCOUNTER
Future Appointments   Date Time Provider John Lane   4/22/2023 11:00 AM Michelle Barrett MD [facility] [address] [facility] [address]

## 2023-04-05 NOTE — TELEPHONE ENCOUNTER
Pt requesting refills for amLODIPine 10 MG Oral Tab  And losartan 50 MG Oral Tab    To be sent to   [address] [place] [address] (Conrad Morrison, [address]    Phone: [phone] Fax: [phone]

## 2023-04-06 RX ORDER — AMLODIPINE BESYLATE 10 MG/1
10 TABLET ORAL DAILY
Qty: 90 TABLET | Refills: 0 | Status: SHIPPED | OUTPATIENT
Start: 2023-04-06

## 2023-04-06 RX ORDER — LOSARTAN POTASSIUM 50 MG/1
50 TABLET ORAL DAILY
Qty: 90 TABLET | Refills: 0 | Status: SHIPPED | OUTPATIENT
Start: 2023-04-06

## 2023-04-22 ENCOUNTER — OFFICE VISIT (OUTPATIENT)
Dept: FAMILY MEDICINE CLINIC | Facility: CLINIC | Age: 53
End: 2023-04-22
Payer: COMMERCIAL

## 2023-04-22 VITALS
SYSTOLIC BLOOD PRESSURE: 128 MMHG | BODY MASS INDEX: 33.65 KG/M2 | HEIGHT: 71 IN | WEIGHT: 240.38 LBS | TEMPERATURE: 98 F | HEART RATE: 86 BPM | OXYGEN SATURATION: 98 % | DIASTOLIC BLOOD PRESSURE: 70 MMHG | RESPIRATION RATE: 16 BRPM

## 2023-04-22 DIAGNOSIS — Z00.00 ROUTINE ADULT HEALTH MAINTENANCE: Primary | ICD-10-CM

## 2023-04-22 DIAGNOSIS — I10 ESSENTIAL HYPERTENSION, BENIGN: ICD-10-CM

## 2023-04-22 PROCEDURE — 3074F SYST BP LT 130 MM HG: CPT | Performed by: FAMILY MEDICINE

## 2023-04-22 PROCEDURE — 99214 OFFICE O/P EST MOD 30 MIN: CPT | Performed by: FAMILY MEDICINE

## 2023-04-22 PROCEDURE — 3078F DIAST BP <80 MM HG: CPT | Performed by: FAMILY MEDICINE

## 2023-04-22 PROCEDURE — 3008F BODY MASS INDEX DOCD: CPT | Performed by: FAMILY MEDICINE

## 2023-04-22 RX ORDER — LOSARTAN POTASSIUM 50 MG/1
50 TABLET ORAL DAILY
Qty: 90 TABLET | Refills: 1 | Status: SHIPPED | OUTPATIENT
Start: 2023-04-22

## 2023-04-22 RX ORDER — AMLODIPINE BESYLATE 10 MG/1
10 TABLET ORAL DAILY
Qty: 90 TABLET | Refills: 1 | Status: SHIPPED | OUTPATIENT
Start: 2023-04-22

## 2023-07-16 DIAGNOSIS — I10 ESSENTIAL HYPERTENSION, BENIGN: ICD-10-CM

## 2023-07-17 RX ORDER — AMLODIPINE BESYLATE 10 MG/1
10 TABLET ORAL DAILY
Qty: 90 TABLET | Refills: 1 | Status: SHIPPED | OUTPATIENT
Start: 2023-07-17

## 2023-07-17 RX ORDER — LOSARTAN POTASSIUM 50 MG/1
50 TABLET ORAL DAILY
Qty: 90 TABLET | Refills: 1 | Status: SHIPPED | OUTPATIENT
Start: 2023-07-17

## 2023-07-17 NOTE — TELEPHONE ENCOUNTER
Name from pharmacy: Amlodipine Besylate 10mg Tablet          Will file in chart as: AMLODIPINE 10 MG Oral Tab    Sig: Take 1 tablet by mouth daily. Disp: 90 tablet    Refills: 1 (Pharmacy requested: Not specified)    Start: 7/16/2023    Class: Normal    Non-formulary For: Essential hypertension, benign    Last ordered: 2 months ago by Ivy Butler MD Last refill: 7/16/2023    Rx #: [id number]    Hypertension Medications Protocol Rrcnxk5507/16/2023 04:29 AM   Protocol Details CMP or BMP in past 12 months    Last serum creatinine< 2.0    Appointment in past 6 or next 3 months       Name from pharmacy: Losartan Potassium 50mg Tablet         Will file in chart as: LOSARTAN 50 MG Oral Tab    Sig: Take 1 tablet by mouth daily.     Disp: 90 tablet    Refills: 1 (Pharmacy requested: Not specified)    Start: 7/16/2023    Class: Normal    Non-formulary For: Essential hypertension, benign    Last ordered: 2 months ago by Ivy Butler MD Last refill: 7/16/2023    Rx #: [id number]    Hypertension Medications Protocol Lcjxvr6807/16/2023 04:29 AM   Protocol Details CMP or BMP in past 12 months    Last serum creatinine< 2.0    Appointment in past 6 or next 3 months      To be filled at: [facility] by Timothy, [facility],[address] - [address] [place] [address] [phone], [phone]

## 2023-11-18 ENCOUNTER — LAB ENCOUNTER (OUTPATIENT)
Dept: LAB | Age: 53
End: 2023-11-18
Attending: FAMILY MEDICINE
Payer: COMMERCIAL

## 2023-11-18 ENCOUNTER — OFFICE VISIT (OUTPATIENT)
Dept: FAMILY MEDICINE CLINIC | Facility: CLINIC | Age: 53
End: 2023-11-18
Payer: COMMERCIAL

## 2023-11-18 VITALS
OXYGEN SATURATION: 97 % | BODY MASS INDEX: 32.76 KG/M2 | RESPIRATION RATE: 14 BRPM | TEMPERATURE: 98 F | SYSTOLIC BLOOD PRESSURE: 118 MMHG | HEIGHT: 71 IN | HEART RATE: 88 BPM | DIASTOLIC BLOOD PRESSURE: 88 MMHG | WEIGHT: 234 LBS

## 2023-11-18 DIAGNOSIS — Z00.00 ROUTINE ADULT HEALTH MAINTENANCE: Primary | ICD-10-CM

## 2023-11-18 DIAGNOSIS — Z00.00 ROUTINE ADULT HEALTH MAINTENANCE: ICD-10-CM

## 2023-11-18 DIAGNOSIS — Z13.6 ISCHEMIC HEART DISEASE SCREEN: ICD-10-CM

## 2023-11-18 LAB
ALBUMIN SERPL-MCNC: 3.6 G/DL (ref 3.4–5)
ALBUMIN/GLOB SERPL: 0.9 {RATIO} (ref 1–2)
ALP LIVER SERPL-CCNC: 116 U/L
ALT SERPL-CCNC: 99 U/L
ANION GAP SERPL CALC-SCNC: 3 MMOL/L (ref 0–18)
AST SERPL-CCNC: 54 U/L (ref 15–37)
BILIRUB SERPL-MCNC: 0.6 MG/DL (ref 0.1–2)
BUN BLD-MCNC: 9 MG/DL (ref 9–23)
CALCIUM BLD-MCNC: 8.9 MG/DL (ref 8.5–10.1)
CHLORIDE SERPL-SCNC: 110 MMOL/L (ref 98–112)
CHOLEST SERPL-MCNC: 128 MG/DL (ref ?–200)
CO2 SERPL-SCNC: 30 MMOL/L (ref 21–32)
CREAT BLD-MCNC: 1 MG/DL
EGFRCR SERPLBLD CKD-EPI 2021: 91 ML/MIN/1.73M2 (ref 60–?)
ERYTHROCYTE [DISTWIDTH] IN BLOOD BY AUTOMATED COUNT: 16.2 %
FASTING PATIENT LIPID ANSWER: YES
FASTING STATUS PATIENT QL REPORTED: YES
GLOBULIN PLAS-MCNC: 3.9 G/DL (ref 2.8–4.4)
GLUCOSE BLD-MCNC: 92 MG/DL (ref 70–99)
HCT VFR BLD AUTO: 43.6 %
HDLC SERPL-MCNC: 45 MG/DL (ref 40–59)
HGB BLD-MCNC: 13.3 G/DL
LDLC SERPL CALC-MCNC: 72 MG/DL (ref ?–100)
MCH RBC QN AUTO: 23.7 PG (ref 26–34)
MCHC RBC AUTO-ENTMCNC: 30.5 G/DL (ref 31–37)
MCV RBC AUTO: 77.6 FL
NONHDLC SERPL-MCNC: 83 MG/DL (ref ?–130)
OSMOLALITY SERPL CALC.SUM OF ELEC: 294 MOSM/KG (ref 275–295)
PLATELET # BLD AUTO: 291 10(3)UL (ref 150–450)
POTASSIUM SERPL-SCNC: 3.2 MMOL/L (ref 3.5–5.1)
PROT SERPL-MCNC: 7.5 G/DL (ref 6.4–8.2)
PSA SERPL-MCNC: 0.75 NG/ML (ref ?–4)
RBC # BLD AUTO: 5.62 X10(6)UL
SODIUM SERPL-SCNC: 143 MMOL/L (ref 136–145)
T4 FREE SERPL-MCNC: 1 NG/DL (ref 0.8–1.7)
TRIGL SERPL-MCNC: 50 MG/DL (ref 30–149)
TSI SER-ACNC: 0.84 MIU/ML (ref 0.36–3.74)
VLDLC SERPL CALC-MCNC: 8 MG/DL (ref 0–30)
WBC # BLD AUTO: 4.5 X10(3) UL (ref 4–11)

## 2023-11-18 PROCEDURE — 80053 COMPREHEN METABOLIC PANEL: CPT

## 2023-11-18 PROCEDURE — 3074F SYST BP LT 130 MM HG: CPT | Performed by: FAMILY MEDICINE

## 2023-11-18 PROCEDURE — 36415 COLL VENOUS BLD VENIPUNCTURE: CPT

## 2023-11-18 PROCEDURE — 85027 COMPLETE CBC AUTOMATED: CPT

## 2023-11-18 PROCEDURE — 3008F BODY MASS INDEX DOCD: CPT | Performed by: FAMILY MEDICINE

## 2023-11-18 PROCEDURE — 3079F DIAST BP 80-89 MM HG: CPT | Performed by: FAMILY MEDICINE

## 2023-11-18 PROCEDURE — 80061 LIPID PANEL: CPT

## 2023-11-18 PROCEDURE — 99396 PREV VISIT EST AGE 40-64: CPT | Performed by: FAMILY MEDICINE

## 2023-11-18 PROCEDURE — 84439 ASSAY OF FREE THYROXINE: CPT

## 2023-11-18 PROCEDURE — 84153 ASSAY OF PSA TOTAL: CPT

## 2023-11-18 PROCEDURE — 84443 ASSAY THYROID STIM HORMONE: CPT

## 2023-11-18 RX ORDER — LOSARTAN POTASSIUM 100 MG/1
100 TABLET ORAL DAILY
Qty: 90 TABLET | Refills: 1 | Status: SHIPPED | OUTPATIENT
Start: 2023-11-18

## 2023-11-22 DIAGNOSIS — E87.6 HYPOKALEMIA: Primary | ICD-10-CM

## 2023-11-22 RX ORDER — POTASSIUM CHLORIDE 1500 MG/1
20 TABLET, EXTENDED RELEASE ORAL DAILY
Qty: 30 TABLET | Refills: 0 | Status: SHIPPED | OUTPATIENT
Start: 2023-11-22 | End: 2023-11-29

## 2023-12-15 RX ORDER — POTASSIUM CHLORIDE 20 MEQ/1
20 TABLET, EXTENDED RELEASE ORAL DAILY
Qty: 30 TABLET | Refills: 0 | OUTPATIENT
Start: 2023-12-15

## 2023-12-16 ENCOUNTER — TELEPHONE (OUTPATIENT)
Dept: FAMILY MEDICINE CLINIC | Facility: CLINIC | Age: 53
End: 2023-12-16

## 2023-12-16 NOTE — TELEPHONE ENCOUNTER
Fax received from [phone] requesting r/f on potassium 20 oral tabs.     Future Appointments   Date Time Provider John Lane   7/2/2024 10:30 AM PF CT RM1 PF [facility] [place]     LOV: cpx: 11/18/23  Last r/f: 7/13/21 # 7 0 r/fs  Labs: 11/18/23     RTC: yearly    Please advise

## 2023-12-30 ENCOUNTER — LAB ENCOUNTER (OUTPATIENT)
Dept: LAB | Age: 53
End: 2023-12-30
Attending: FAMILY MEDICINE
Payer: COMMERCIAL

## 2023-12-30 DIAGNOSIS — E87.6 HYPOKALEMIA: ICD-10-CM

## 2023-12-30 LAB
ANION GAP SERPL CALC-SCNC: 3 MMOL/L (ref 0–18)
BUN BLD-MCNC: 11 MG/DL (ref 9–23)
CALCIUM BLD-MCNC: 9 MG/DL (ref 8.5–10.1)
CHLORIDE SERPL-SCNC: 109 MMOL/L (ref 98–112)
CO2 SERPL-SCNC: 31 MMOL/L (ref 21–32)
CREAT BLD-MCNC: 0.98 MG/DL
EGFRCR SERPLBLD CKD-EPI 2021: 93 ML/MIN/1.73M2 (ref 60–?)
FASTING STATUS PATIENT QL REPORTED: NO
GLUCOSE BLD-MCNC: 133 MG/DL (ref 70–99)
OSMOLALITY SERPL CALC.SUM OF ELEC: 297 MOSM/KG (ref 275–295)
POTASSIUM SERPL-SCNC: 3.2 MMOL/L (ref 3.5–5.1)
SODIUM SERPL-SCNC: 143 MMOL/L (ref 136–145)

## 2023-12-30 PROCEDURE — 80048 BASIC METABOLIC PNL TOTAL CA: CPT | Performed by: FAMILY MEDICINE

## 2024-01-14 DIAGNOSIS — I10 ESSENTIAL HYPERTENSION, BENIGN: ICD-10-CM

## 2024-01-15 ENCOUNTER — TELEMEDICINE (OUTPATIENT)
Dept: FAMILY MEDICINE CLINIC | Facility: CLINIC | Age: 54
End: 2024-01-15
Payer: COMMERCIAL

## 2024-01-15 DIAGNOSIS — E87.6 HYPOKALEMIA: Primary | ICD-10-CM

## 2024-01-15 RX ORDER — AMLODIPINE BESYLATE 10 MG/1
10 TABLET ORAL DAILY
Qty: 90 TABLET | Refills: 0 | Status: SHIPPED | OUTPATIENT
Start: 2024-01-15

## 2024-01-15 RX ORDER — POTASSIUM CHLORIDE 1500 MG/1
20 TABLET, EXTENDED RELEASE ORAL DAILY
Qty: 7 TABLET | Refills: 0 | Status: SHIPPED | OUTPATIENT
Start: 2024-01-15 | End: 2024-01-22

## 2024-01-15 NOTE — PROGRESS NOTES
Subjective    This visit is conducted using Telemedicine with live, interactive video and audio.    Chief Complaint:  Chief Complaint   Patient presents with    Follow - Up         The patient confirmed knowledge of the limitations of the use of telemedicine were verbally confirmed by the provider.  Verification of patient identity was established.  Patient understands and accepts financial responsibility for any deductible, co-insurance and/or co-pays associated with this service.          HPI:        Patient has chronic hypokalemia. Baseline K is ~3.2  He was placed on potassium supplement with no improvement in the potassium.   He has a hx of HTN- on amlodipine and losartan.   Reports polyuria. Denies diarrhea, hx of DM.   Glucose 133  He take magnesium supplement.   No hx of renal disease.   Denies muscle weakness, fatigue, abdominal pain, chest pain, palpitations, hx of arrhythmia.         Review of Systems   Constitutional: Negative for fever, chills and fatigue. No distress.  Respiratory: Negative for cough, chest tightness, shortness of breath and wheezing.    Cardiovascular: Negative for chest pain, palpitations and leg swelling.   Gastrointestinal: Negative for nausea, vomiting, abdominal pain, diarrhea, blood in stool and abdominal distention.        HISTORY:  Past Medical History:   Diagnosis Date    Essential hypertension     Essential hypertension, benign 1/23/2015    Uncontrolled       Past Surgical History:   Procedure Laterality Date    EGD N/A 7/14/2020    Procedure: ESOPHAGOGASTRODUODENOSCOPY, COLONOSCOPY, POSSIBLE BIOPSY, POSSIBLE POLYPECTOMY 38555, 11490;  Surgeon: Tal Tariq MD;  Location: [facility]      Family History   Problem Relation Age of Onset    Cancer Father       Social History     Socioeconomic History    Marital status:    Tobacco Use    Smoking status: Never    Smokeless tobacco: Never   Substance and Sexual Activity    Alcohol use: No     Alcohol/week: 0.0 standard  drinks of alcohol    Drug use: No   Other Topics Concern    Caffeine Concern No    Exercise Yes     Comment: Walking twice weekly              Objective    Physical Exam:  alert, appears stated age, and cooperative, Speaking in full sentences comfortably, and Normal work of breathing  Psych: Mood is stable, Affect appropriate    Assessment/Plan:    1. Hypokalemia  Started on kduur 20meq x 1 week. Increase potassium rich food intake.   Workup as below  R/o DM  R/o hypomagnesemia, hyperaldosteronism.   Referred to Nephro   Repeat bmp 1 week.   - Potassium Chloride ER 20 MEQ Oral Tab CR; Take 20 mEq by mouth daily for 7 days.  Dispense: 7 tablet; Refill: 0  - Nephrology Referral - In Network  - Potassium, Urine, Random [E]; Future  - Aldosterone/Renin Ratio; Future  - Renin, Plasma; Future  - Magnesium [E]; Future  - Basic Metabolic Panel (8) [E]; Future  - Hemoglobin A1C [E]; Future      Diagnostic rationale, follow up instructions, and strict precautions/indications for emergent direct evaluation were discussed with the patient. The patient agrees with the plan, and understands to follow up with their primary care physician or other healthcare provider within 48-72 hours for reevaluation for persistent or worsening symptoms.    Patient understands phone evaluation is not a substitute for face-to-face examination or emergency care. Patient advised to go to ER or call 911 for worsening symptoms or acute distress.         Lori Kirkpatrick DO

## 2024-01-15 NOTE — TELEPHONE ENCOUNTER
Medication Quantity Refills Start End   AMLODIPINE 10 MG Oral Tab 90 tablet 1 7/17/2023 --   Sig:   Take 1 tablet by mouth daily.     Route:   Oral     Order #:   [id number]         Hypertension Medications Protocol Hatllr0301/14/2024 04:08 AM   Protocol Details CMP or BMP in past 12 months    Last serum creatinine< 2.0    Appointment in past 6 or next 3 months     RX sent per protocol

## 2024-01-27 ENCOUNTER — LAB ENCOUNTER (OUTPATIENT)
Dept: LAB | Age: 54
End: 2024-01-27
Attending: FAMILY MEDICINE
Payer: COMMERCIAL

## 2024-01-27 DIAGNOSIS — E87.6 HYPOKALEMIA: ICD-10-CM

## 2024-01-27 LAB
ANION GAP SERPL CALC-SCNC: 5 MMOL/L (ref 0–18)
BUN BLD-MCNC: 12 MG/DL (ref 9–23)
CALCIUM BLD-MCNC: 9.1 MG/DL (ref 8.5–10.1)
CHLORIDE SERPL-SCNC: 110 MMOL/L (ref 98–112)
CO2 SERPL-SCNC: 29 MMOL/L (ref 21–32)
CREAT BLD-MCNC: 1.01 MG/DL
EGFRCR SERPLBLD CKD-EPI 2021: 89 ML/MIN/1.73M2 (ref 60–?)
EST. AVERAGE GLUCOSE BLD GHB EST-MCNC: 134 MG/DL (ref 68–126)
FASTING STATUS PATIENT QL REPORTED: YES
GLUCOSE BLD-MCNC: 101 MG/DL (ref 70–99)
HBA1C MFR BLD: 6.3 % (ref ?–5.7)
MAGNESIUM SERPL-MCNC: 2.2 MG/DL (ref 1.6–2.6)
OSMOLALITY SERPL CALC.SUM OF ELEC: 298 MOSM/KG (ref 275–295)
POTASSIUM SERPL-SCNC: 3.5 MMOL/L (ref 3.5–5.1)
POTASSIUM UR-SCNC: 67.6 MMOL/L
SODIUM SERPL-SCNC: 144 MMOL/L (ref 136–145)

## 2024-01-27 PROCEDURE — 84133 ASSAY OF URINE POTASSIUM: CPT

## 2024-01-27 PROCEDURE — 84244 ASSAY OF RENIN: CPT

## 2024-01-27 PROCEDURE — 83036 HEMOGLOBIN GLYCOSYLATED A1C: CPT

## 2024-01-27 PROCEDURE — 83735 ASSAY OF MAGNESIUM: CPT

## 2024-01-27 PROCEDURE — 82088 ASSAY OF ALDOSTERONE: CPT

## 2024-01-27 PROCEDURE — 80048 BASIC METABOLIC PNL TOTAL CA: CPT

## 2024-01-29 DIAGNOSIS — I10 ESSENTIAL HYPERTENSION, BENIGN: ICD-10-CM

## 2024-01-31 RX ORDER — AMLODIPINE BESYLATE 10 MG/1
10 TABLET ORAL DAILY
Qty: 90 TABLET | Refills: 0 | Status: SHIPPED | OUTPATIENT
Start: 2024-01-31

## 2024-01-31 RX ORDER — LOSARTAN POTASSIUM 100 MG/1
100 TABLET ORAL DAILY
Qty: 90 TABLET | Refills: 1 | Status: SHIPPED | OUTPATIENT
Start: 2024-01-31

## 2024-01-31 NOTE — TELEPHONE ENCOUNTER
Request for refill:  Last OV-11/18/2023      amLODIPine 10 MG Oral Tab          Sig: Take 1 tablet (10 mg total) by mouth daily.    Disp: 90 tablet    Refills: 0    Start: 1/29/2024    Class: Normal    For: Essential hypertension, benign        losartan 100 MG Oral Tab         Sig: Take 1 tablet (100 mg total) by mouth daily.    Disp: 90 tablet    Refills: 1    Start: 1/29/2024    Class: Normal     Last BMP-  Lab Results   Component Value Date     (H) 01/27/2024    BUN 12 01/27/2024    CREATSERUM 1.01 01/27/2024    BUNCREA 10.0 07/12/2021    ANIONGAP 5 01/27/2024    GFRAA 71 08/07/2021    GFRNAA 61 08/07/2021    CA 9.1 01/27/2024     01/27/2024    K 3.5 01/27/2024     01/27/2024    CO2 29.0 01/27/2024    OSMOCALC 298 (H) 01/27/2024

## 2024-02-04 LAB
ALDOST/RENIN RATIO: 67.5
ALDOSTERONE: 48.7 NG/DL
RENIN ACTIVITY: 0.72 NG/ML/HR

## 2024-02-08 ENCOUNTER — OFFICE VISIT (OUTPATIENT)
Dept: NEPHROLOGY | Facility: CLINIC | Age: 54
End: 2024-02-08
Payer: COMMERCIAL

## 2024-02-08 VITALS — WEIGHT: 233.25 LBS | DIASTOLIC BLOOD PRESSURE: 64 MMHG | BODY MASS INDEX: 33 KG/M2 | SYSTOLIC BLOOD PRESSURE: 122 MMHG

## 2024-02-08 DIAGNOSIS — D35.00 ADRENAL ADENOMA, UNSPECIFIED LATERALITY: Primary | ICD-10-CM

## 2024-02-08 DIAGNOSIS — I10 PRIMARY HYPERTENSION: ICD-10-CM

## 2024-02-08 DIAGNOSIS — E87.6 HYPOKALEMIA: ICD-10-CM

## 2024-02-08 DIAGNOSIS — E26.9 HYPERALDOSTERONISM (HCC): ICD-10-CM

## 2024-02-08 PROCEDURE — 3078F DIAST BP <80 MM HG: CPT | Performed by: INTERNAL MEDICINE

## 2024-02-08 PROCEDURE — 3074F SYST BP LT 130 MM HG: CPT | Performed by: INTERNAL MEDICINE

## 2024-02-08 PROCEDURE — 99244 OFF/OP CNSLTJ NEW/EST MOD 40: CPT | Performed by: INTERNAL MEDICINE

## 2024-02-08 NOTE — PROGRESS NOTES
Nephrology Consult Note    REASON FOR CONSULT: Hypokalemia    ASSESSMENT/PLAN:      1) Hypokalemia- longstanding overall with borderline hypokalemia accompanied by mild hyponatremia and metabolic alkalosis, early onset hypertension and more recently an elevated vasquez / renin ratio (67) is consistent with primary hyperaldosteronism.  Meds otherwise benign without diuretics; denies chronic GI losses.  Diet appears unremarkable.  There is no evidence of a renal tubular acidosis.  No history of medication exposure i.e. chemotherapy that can lead to renal tubular dysfunction and renal potassium wasting. PLAN- d/w pt at length; reviewed pathophysiology of hyperaldosteronism. Will obtain abdominal CT to eval for adrenal adenoma; start eplerenone 50 mg daily in lieu of losartan / potassium and recheck labs in 2 weeks and monitor BP. Is unlikely to require adrenal vein sampling / laparascopic adrenalectomy.        HPI:   Kranthi Parmar is a 53 year old male with   Chief Complaint   Patient presents with    Consult     Ref'd by Dr. Kirkpatrick for hypokalemia     Doe Carranza MD    Very pleasant 53-year-old male presents for evaluation of chronic hypokalemia.  Is been present since his initial labs in 2014 with potassium levels in the 3.2 to 3.9 mg/L range averaging about 3.5 mEq/L.  He is was diagnosed with hypertension about 10 years ago and has been on a stable and hypertensive regimen of amlodipine and losartan with reasonable BPs.  He was recently started on potassium supplementation without much improvement in his potassium levels.  Please see above for further details.    ROS:    Denies fever/chills  Denies wt loss/gain  Denies HA or visual changes  Denies CP or palpitations  Denies SOB/cough/hemoptysis  Denies abd or flank pain  Denies N/V/D  Denies change in urinary habits or gross hematuria  Denies LE edema  Denies skin rashes/myalgias/arthralgias    PMH:  Past Medical History:   Diagnosis Date    Essential  hypertension     Essential hypertension, benign 1/23/2015    Uncontrolled        PSH:  Past Surgical History:   Procedure Laterality Date    EGD N/A 7/14/2020    Procedure: ESOPHAGOGASTRODUODENOSCOPY, COLONOSCOPY, POSSIBLE BIOPSY, POSSIBLE POLYPECTOMY 45647, 23140;  Surgeon: Tal Tariq MD;  Location: [facility]       Medications (Active prior to today's visit):  Current Outpatient Medications   Medication Sig Dispense Refill    amLODIPine 10 MG Oral Tab Take 1 tablet (10 mg total) by mouth daily. 90 tablet 0    losartan 100 MG Oral Tab Take 1 tablet (100 mg total) by mouth daily. 90 tablet 1    Cholecalciferol 5000 units Oral Tab Take 2 tablets (10,000 Units total) by mouth daily.      Ascorbic Acid (VITAMIN C) 100 MG Oral Tab Take 1 tablet (100 mg total) by mouth daily.      Naproxen Sodium 220 MG Oral Cap Take 220 mg by mouth.      Multiple Vitamin (ONE-A-DAY MENS) Oral Tab Take 1 tablet by mouth daily.      LOSARTAN 50 MG Oral Tab Take 1 tablet by mouth daily. (Patient not taking: Reported on 2/8/2024) 90 tablet 1    acetaminophen 500 MG Oral Tab Take 1 tablet (500 mg total) by mouth every 6 (six) hours as needed for Pain. (Patient not taking: Reported on 2/8/2024)         Allergies:  No Known Allergies    Social History:  Social History     Socioeconomic History    Marital status:    Tobacco Use    Smoking status: Never    Smokeless tobacco: Never   Substance and Sexual Activity    Alcohol use: No     Alcohol/week: 0.0 standard drinks of alcohol    Drug use: No   Other Topics Concern    Caffeine Concern No    Exercise Yes     Comment: Walking twice weekly        Family History:  Denies family history of kidney disease.    PHYSICAL EXAM:   /64 (BP Location: Left arm, Patient Position: Sitting)   Wt 233 lb 4 oz (105.8 kg)   BMI 32.53 kg/m²    Wt Readings from Last 3 Encounters:   02/08/24 233 lb 4 oz (105.8 kg)   11/18/23 234 lb (106.1 kg)   04/22/23 240 lb 6 oz (109 kg)     General: Alert and  oriented in no apparent distress.  HEENT: No scleral icterus, MMM  Neck: Supple, no GRETCHEN or thyromegaly  Cardiac: Regular rate and rhythm, S1, S2 normal, no murmur or rub  Lungs: Clear without wheezes, rales, rhonchi.    Abdomen: Soft, non-tender. + bowel sounds, no palpable organomegaly  Extremities: Without clubbing, cyanosis or edema.  Neurologic:  normal affect, cranial nerves grossly intact, moving all extremities  Skin: Warm and dry, no rashes        Walter Burger MD  2/8/2024  4:07 PM

## 2024-02-12 DIAGNOSIS — E87.6 HYPOKALEMIA: Primary | ICD-10-CM

## 2024-02-12 RX ORDER — EPLERENONE 50 MG/1
50 TABLET, FILM COATED ORAL DAILY
Qty: 90 TABLET | Refills: 1 | Status: SHIPPED | OUTPATIENT
Start: 2024-02-12

## 2024-02-12 RX ORDER — EPLERENONE 50 MG/1
50 TABLET, FILM COATED ORAL DAILY
COMMUNITY
End: 2024-02-12

## 2024-02-13 ENCOUNTER — HOSPITAL ENCOUNTER (OUTPATIENT)
Dept: CT IMAGING | Age: 54
Discharge: HOME | End: 2024-02-13
Attending: INTERNAL MEDICINE
Payer: COMMERCIAL

## 2024-02-13 DIAGNOSIS — E26.9 HYPERALDOSTERONISM (HCC): ICD-10-CM

## 2024-02-13 DIAGNOSIS — E87.6 HYPOKALEMIA: ICD-10-CM

## 2024-02-13 DIAGNOSIS — D35.00 ADRENAL ADENOMA, UNSPECIFIED LATERALITY: ICD-10-CM

## 2024-02-13 DIAGNOSIS — I10 PRIMARY HYPERTENSION: ICD-10-CM

## 2024-02-13 PROCEDURE — 74160 CT ABDOMEN W/CONTRAST: CPT | Performed by: INTERNAL MEDICINE

## 2024-03-02 ENCOUNTER — LAB ENCOUNTER (OUTPATIENT)
Dept: LAB | Age: 54
End: 2024-03-02
Attending: INTERNAL MEDICINE
Payer: COMMERCIAL

## 2024-03-02 DIAGNOSIS — I10 PRIMARY HYPERTENSION: ICD-10-CM

## 2024-03-02 DIAGNOSIS — D35.00 ADRENAL ADENOMA, UNSPECIFIED LATERALITY: ICD-10-CM

## 2024-03-02 DIAGNOSIS — E26.9 HYPERALDOSTERONISM (HCC): ICD-10-CM

## 2024-03-02 DIAGNOSIS — E87.6 HYPOKALEMIA: ICD-10-CM

## 2024-03-02 LAB
ANION GAP SERPL CALC-SCNC: 5 MMOL/L (ref 0–18)
BUN BLD-MCNC: 10 MG/DL (ref 9–23)
CALCIUM BLD-MCNC: 10 MG/DL (ref 8.5–10.1)
CHLORIDE SERPL-SCNC: 109 MMOL/L (ref 98–112)
CO2 SERPL-SCNC: 27 MMOL/L (ref 21–32)
CREAT BLD-MCNC: 1.01 MG/DL
EGFRCR SERPLBLD CKD-EPI 2021: 89 ML/MIN/1.73M2 (ref 60–?)
FASTING STATUS PATIENT QL REPORTED: YES
GLUCOSE BLD-MCNC: 95 MG/DL (ref 70–99)
OSMOLALITY SERPL CALC.SUM OF ELEC: 291 MOSM/KG (ref 275–295)
POTASSIUM SERPL-SCNC: 3.5 MMOL/L (ref 3.5–5.1)
SODIUM SERPL-SCNC: 141 MMOL/L (ref 136–145)

## 2024-03-02 PROCEDURE — 80048 BASIC METABOLIC PNL TOTAL CA: CPT

## 2024-03-02 PROCEDURE — 84244 ASSAY OF RENIN: CPT

## 2024-03-03 ENCOUNTER — PATIENT MESSAGE (OUTPATIENT)
Dept: NEPHROLOGY | Facility: CLINIC | Age: 54
End: 2024-03-03

## 2024-03-05 RX ORDER — METOPROLOL SUCCINATE 25 MG/1
25 TABLET, EXTENDED RELEASE ORAL DAILY
Qty: 30 TABLET | Refills: 11 | Status: SHIPPED | OUTPATIENT
Start: 2024-03-05

## 2024-03-05 NOTE — TELEPHONE ENCOUNTER
From: Walter Burger  To: Kranthi Parmar  Sent: 3/3/2024 8:20 PM CST  Subject: Labs    Kranthi-    Your potassium is normal- how is your blood pressure?    Jessica Burger

## 2024-03-07 LAB — RENIN ACTIVITY: 0.99 NG/ML/HR

## 2024-03-23 ENCOUNTER — PATIENT MESSAGE (OUTPATIENT)
Dept: NEPHROLOGY | Facility: CLINIC | Age: 54
End: 2024-03-23

## 2024-04-04 ENCOUNTER — TELEPHONE (OUTPATIENT)
Dept: FAMILY MEDICINE CLINIC | Facility: CLINIC | Age: 54
End: 2024-04-04

## 2024-04-09 RX ORDER — METOPROLOL SUCCINATE 50 MG/1
50 TABLET, EXTENDED RELEASE ORAL DAILY
Qty: 90 TABLET | Refills: 3 | Status: SHIPPED | OUTPATIENT
Start: 2024-04-09

## 2024-04-09 NOTE — TELEPHONE ENCOUNTER
From: Kranthi Parmar  To: Walter Burger  Sent: 3/23/2024 9:10 AM CDT  Subject: Beta Blocker Followup    Hi Dr. Burger,    Its been two weeks with adding the Beta Blocker, and my BP is from 145x99 to 133x93 and HR 79 to 74.     Some days I have to take an Losartan to lower the pressure.

## 2024-05-17 ENCOUNTER — TELEMEDICINE (OUTPATIENT)
Dept: FAMILY MEDICINE CLINIC | Facility: CLINIC | Age: 54
End: 2024-05-17

## 2024-05-17 DIAGNOSIS — G43.009 ATYPICAL MIGRAINE: Primary | ICD-10-CM

## 2024-05-17 PROCEDURE — 99213 OFFICE O/P EST LOW 20 MIN: CPT | Performed by: FAMILY MEDICINE

## 2024-05-17 RX ORDER — GABAPENTIN 300 MG/1
300 CAPSULE ORAL NIGHTLY
Qty: 14 CAPSULE | Refills: 0 | Status: SHIPPED | OUTPATIENT
Start: 2024-05-17 | End: 2024-05-31

## 2024-05-17 NOTE — PROGRESS NOTES
Subjective    This visit is conducted using Telemedicine with live, interactive video and audio.    Chief Complaint:  Chief Complaint   Patient presents with    Headache         The patient confirmed knowledge of the limitations of the use of telemedicine were verbally confirmed by the provider.  Verification of patient identity was established.  Patient understands and accepts financial responsibility for any deductible, co-insurance and/or co-pays associated with this service.      HPI:  Patient reports headache on the right posterior occipout area.   It started 2 weeks ago.    Denies hx of headaches or migraines.   Denies trauma.   Pain scale: 4/10   Treatments tried: Excedrin- this did help a bit.   He denies any numbness, tingling, weakness, changes in her vision, nausea, emesis.    The pain is concentrated on the right right side of the head only.   It is a sharp pain.  It is not constant- it lasts about 20 minutes  Denies neck pain.   Denies rash. Denies exposure to shingles.   Denies any neurological deficits in the past.  /88         Review of Systems   Constitutional: Negative for fever, chills and fatigue. No distress.  HENT: Negative for hearing loss, congestion, sore throat, neck pain  Eyes: Negative for pain and visual disturbance.   Respiratory: Negative for cough, chest tightness, shortness of breath and wheezing.    Cardiovascular: Negative for chest pain, palpitations and leg swelling.   Gastrointestinal: Negative for nausea, vomiting, abdominal pain  Neurological: Negative for dizziness, syncope, weakness, numbness, tingling     HISTORY:  Past Medical History:    Essential hypertension    Essential hypertension, benign    Uncontrolled       Past Surgical History:   Procedure Laterality Date    Egd N/A 7/14/2020    Procedure: ESOPHAGOGASTRODUODENOSCOPY, COLONOSCOPY, POSSIBLE BIOPSY, POSSIBLE POLYPECTOMY 72959, 14684;  Surgeon: Tal Tariq MD;  Location: [facility]      Family History    Problem Relation Age of Onset    Cancer Father       Social History     Socioeconomic History    Marital status:    Tobacco Use    Smoking status: Never    Smokeless tobacco: Never   Substance and Sexual Activity    Alcohol use: No     Alcohol/week: 0.0 standard drinks of alcohol    Drug use: No   Other Topics Concern    Caffeine Concern No    Exercise Yes     Comment: Walking twice weekly              Objective    Physical Exam:  alert, appears stated age, and cooperative, Speaking in full sentences comfortably, Normal work of breathing, and age appropriate  Neuro: Alert/oriented x3, speech is fluent, face symmetric  Psych: Mood is stable, Affect appropriate    Assessment/Plan:    1. Atypical migraine  Pt complains of focal area of pain on his head.   Ddx: atypical migraine, spasm, brain bleed  CT ordered w/o contrast.   Started on gabapentin.   Avoid nsaids.   Monitor bp at home  If s/s worsen- vision change, nausea, emesis, high BP, numbness tingling weakness, throbbing h/a or neurological deficits occur then he should go to ER  Otherwise see me in person in 2 week since video exam for this complaint is not sufficient.   - gabapentin 300 MG Oral Cap; Take 1 capsule (300 mg total) by mouth nightly for 14 days.  Dispense: 14 capsule; Refill: 0  - Discussed medication dosage, usage, side effects, and goals of treatment in detail.  Instructed patient to read medication insert for all side effects and contraindications before starting medication.   Instructed patient to review risks of medications and interactions with other medications with the pharmacist.     - CT BRAIN OR HEAD (21461); Future    Diagnostic rationale, follow up instructions, and strict precautions/indications for emergent direct evaluation were discussed with the patient. The patient agrees with the plan, and understands to follow up with their primary care physician or other healthcare provider within 48-72 hours for reevaluation for  persistent or worsening symptoms.    Patient understands phone evaluation is not a substitute for face-to-face examination or emergency care. Patient advised to go to ER or call 911 for worsening symptoms or acute distress.         Lori Kirkpatrick, DO

## 2024-05-20 ENCOUNTER — PATIENT MESSAGE (OUTPATIENT)
Dept: FAMILY MEDICINE CLINIC | Facility: CLINIC | Age: 54
End: 2024-05-20

## 2024-05-21 NOTE — TELEPHONE ENCOUNTER
From: Kranthi Parmar  To: Lori Kirkpatrick  Sent: 5/20/2024 7:03 AM CDT  Subject: Headaches    Good morning Dr. Kirkpatrick,    I think the new medication Metoprolo and Eplerenone may be causing the headaches. I noticed today after I took my morning dose, approximately two hours later the headache started.    I also checked the side affects on both of these meds and fatigue and headaches are a result. Let me know what you think.

## 2024-05-30 ENCOUNTER — HOSPITAL ENCOUNTER (OUTPATIENT)
Dept: CT IMAGING | Age: 54
Discharge: HOME | End: 2024-05-30
Attending: FAMILY MEDICINE
Payer: COMMERCIAL

## 2024-05-30 DIAGNOSIS — G43.009 ATYPICAL MIGRAINE: ICD-10-CM

## 2024-05-30 PROCEDURE — 70450 CT HEAD/BRAIN W/O DYE: CPT | Performed by: FAMILY MEDICINE

## 2024-06-06 ENCOUNTER — PATIENT MESSAGE (OUTPATIENT)
Dept: NEPHROLOGY | Facility: CLINIC | Age: 54
End: 2024-06-06

## 2024-06-12 DIAGNOSIS — E87.6 HYPOKALEMIA: ICD-10-CM

## 2024-06-12 RX ORDER — EPLERENONE 50 MG/1
50 TABLET, FILM COATED ORAL DAILY
Qty: 90 TABLET | Refills: 1 | Status: SHIPPED | OUTPATIENT
Start: 2024-06-12

## 2024-07-01 RX ORDER — CLONIDINE HYDROCHLORIDE 0.1 MG/1
0.1 TABLET ORAL 2 TIMES DAILY
Qty: 60 TABLET | Refills: 11 | Status: SHIPPED | OUTPATIENT
Start: 2024-07-01

## 2024-07-01 NOTE — TELEPHONE ENCOUNTER
From: Kranthi Parmar  To: Watler Burger  Sent: 6/6/2024 6:37 PM CDT  Subject: Headaches and Fatigue    Hi Dr. Burger,    I skipped the eplerenone and the headaches continued. I then stop the metoprolol and the headaches stopped. However, my blood pressure spiked over 140x90 at one time it hit 150x95.     I took a couple of doses of the Losartan and Amlodipine to bring the pressure down.

## 2024-07-02 ENCOUNTER — HOSPITAL ENCOUNTER (OUTPATIENT)
Dept: CT IMAGING | Age: 54
Discharge: HOME | End: 2024-07-02
Attending: FAMILY MEDICINE

## 2024-07-02 VITALS
DIASTOLIC BLOOD PRESSURE: 62 MMHG | SYSTOLIC BLOOD PRESSURE: 116 MMHG | BODY MASS INDEX: 32.62 KG/M2 | HEIGHT: 71 IN | WEIGHT: 233 LBS

## 2024-07-02 DIAGNOSIS — Z13.6 ISCHEMIC HEART DISEASE SCREEN: ICD-10-CM

## 2024-07-02 LAB
GLUCOSE POC: 123 MG/DL (ref 70–100)
HDL POC: 29 MG/DL (ref 40–60)
LDL POC: 63 MG/DL (ref 0–130)
TC/HDL RATIO: 4 (ref 0–5)
TOTAL CHOLESTEROL POC: 122 MG/DL (ref 0–200)
TRIGLYCERIDES POC: 153 MG/DL (ref 0–200)

## 2024-07-02 NOTE — PROGRESS NOTES
Date of Service 7/2/2024    LANDEN FAJARDO  Date of Birth 2/12/1970    Patient Age: 54 year old    PCP: Doe Carranza MD  [address] [address], [facility] [address]    Heart Scan Consult  Preliminary Heart Scan Score: 0    Previous Screening  Heart Scan Completed Previously: Yes  Year of last heart scan: 2019  Score of last heart scan: 0  Peripheral Vascular Scan Completed Previously: No          Risk Factors  Personal Risk Factors  Non-alterable Risk Factors: Age  Alterable Risk Factors: High Blood Pressure;Pre-Diabetes      Body Mass Index  Body mass index is 32.5 kg/m².    Blood Pressure     /62 Comment: started clonodine today ordered by Dr Burger in addition to losartan and amlodipine monitors b/p freq at home     (Normal =< 120/80,  Elevated = 120-129/ >80,  High Stage1 130-139/80-89 , Stage2 >140/>90)    Lipid Profile  Patient was in fasting state: No    Cholesterol: 122, done on 7/2/2024.  HDL Cholesterol: 29, done on 7/2/2024.  LDL Cholesterol: 63, done on 7/2/2024.  TriGlycerides 153, done on 7/2/2024.    Cholesterol Goals  Value   Total  =< 200   HDL  = > 45 Men = > 55 Women   LDL   =< 100   Triglycerides  =< 150       Glucose and Hemoglobin A1C NONFASTING Blood Sugar today   Lab Results   Component Value Date     (A) 07/02/2024    A1C 6.3 (H) 01/27/2024     (Normal Fasting Glucose < 100mg/dl )    Nurse Review  Risk factor information and results reviewed with Nurse: Yes    Recommended Follow Up:  Consult your physician regarding:: Final Heart Scan Report;Discuss potential for Incidental Finding;Discuss Potential for Score Variance      Recommendations for Change:  Nutrition Changes: Low Saturated Fat;Low Fat Dairy;Low Salt Eating;Increase Fiber         Exercise: Enhance Current Program         Weight Management: Decrease Current Weight    Stress Management: Adopt Stress Management Techniques    Repeat Heart Scan: 5 years if Calcium Score is 0.0;Discuss with your  Physician              [place] Recommended Resources:  Recommended Resources: Upcoming Classes, Medical Services and Health Library [email or web address]            Tiesha WU, RN        Please Contact the Nurse Heart Line with any Questions or Concerns [phone].

## 2024-07-12 DIAGNOSIS — I10 ESSENTIAL HYPERTENSION, BENIGN: ICD-10-CM

## 2024-07-12 RX ORDER — AMLODIPINE BESYLATE 10 MG/1
10 TABLET ORAL DAILY
Qty: 90 TABLET | Refills: 0 | Status: SHIPPED | OUTPATIENT
Start: 2024-07-12

## 2024-07-29 ENCOUNTER — OFFICE VISIT (OUTPATIENT)
Dept: FAMILY MEDICINE CLINIC | Facility: CLINIC | Age: 54
End: 2024-07-29
Payer: COMMERCIAL

## 2024-07-29 VITALS
TEMPERATURE: 99 F | BODY MASS INDEX: 32.62 KG/M2 | HEIGHT: 71 IN | DIASTOLIC BLOOD PRESSURE: 82 MMHG | SYSTOLIC BLOOD PRESSURE: 114 MMHG | OXYGEN SATURATION: 98 % | RESPIRATION RATE: 16 BRPM | WEIGHT: 233 LBS | HEART RATE: 93 BPM

## 2024-07-29 DIAGNOSIS — B34.9 ACUTE VIRAL SYNDROME: ICD-10-CM

## 2024-07-29 DIAGNOSIS — Z20.822 SUSPECTED COVID-19 VIRUS INFECTION: Primary | ICD-10-CM

## 2024-07-29 DIAGNOSIS — J02.9 SORE THROAT: ICD-10-CM

## 2024-07-29 LAB
CONTROL LINE PRESENT WITH A CLEAR BACKGROUND (YES/NO): YES YES/NO
KIT LOT #: NORMAL NUMERIC
OPERATOR ID: NORMAL
RAPID SARS-COV-2 BY PCR: NOT DETECTED
STREP GRP A CUL-SCR: NEGATIVE

## 2024-07-29 PROCEDURE — 3008F BODY MASS INDEX DOCD: CPT | Performed by: PHYSICIAN ASSISTANT

## 2024-07-29 PROCEDURE — 87880 STREP A ASSAY W/OPTIC: CPT | Performed by: PHYSICIAN ASSISTANT

## 2024-07-29 PROCEDURE — 99213 OFFICE O/P EST LOW 20 MIN: CPT | Performed by: PHYSICIAN ASSISTANT

## 2024-07-29 PROCEDURE — U0002 COVID-19 LAB TEST NON-CDC: HCPCS | Performed by: PHYSICIAN ASSISTANT

## 2024-07-29 PROCEDURE — 3074F SYST BP LT 130 MM HG: CPT | Performed by: PHYSICIAN ASSISTANT

## 2024-07-29 PROCEDURE — 87081 CULTURE SCREEN ONLY: CPT | Performed by: PHYSICIAN ASSISTANT

## 2024-07-29 PROCEDURE — 3079F DIAST BP 80-89 MM HG: CPT | Performed by: PHYSICIAN ASSISTANT

## 2024-07-29 NOTE — PATIENT INSTRUCTIONS
1 . Rapid strep screen and rapid COVID-19 PCR negative.  2.  Throat culture pending, results anticipated in 48-2 hours.   3.  Encourage fluids, humidifier/vaporizor at bedside, elevate head of bed (sleep with extra pillow), vapor rub to chest, steam therapy if no fever, warm compresses for sinus pressure if no fever, salt water gargles for sore throat, lozenges for sore throat, may try over the counter saline nasal spray or irrigation kit (use distilled water with irrigation kit) for sinus pressure/congestion, get plenty of rest.          Follow up with your primary care provider if your symptoms fail to improve and resolve as anticipated    Go to the Immediate Care or Emergency Department in event of new or worsening symptoms at any time

## 2024-07-29 NOTE — PROGRESS NOTES
CHIEF COMPLAINT:     Chief Complaint   Patient presents with    Sore Throat     Headache, body aches and nasal congestion. S/s for 3 days.  OTC med taken.       HPI:   Kranthi Parmar is a 54 year old male who presents for upper respiratory symptoms for  3 days. Patient reports fatigue, body aches, sore throat, HA, nasal congestion, sneezing.   (+) chills.   Uncertain if febrile given taking OTC APAP cold meds.   NO CP, no SOB/DAVIDSON.     No known ill contacts.     Primary COVID-19 vaccination series UTD, no boosters.     Denies h/o ENT surgeries.     Reports h/o recurrent strep as an adult, sx feel similar to strep in past.     Called off work today, needs work excuse note.     Current Outpatient Medications   Medication Sig Dispense Refill    AMLODIPINE 10 MG Oral Tab Take 1 tablet by mouth daily. 90 tablet 0    cloNIDine 0.1 MG Oral Tab Take 1 tablet (0.1 mg total) by mouth 2 (two) times daily. 60 tablet 11    eplerenone 50 MG Oral Tab Take 1 tablet (50 mg total) by mouth daily. 90 tablet 1    metoprolol succinate ER 50 MG Oral Tablet 24 Hr Take 1 tablet (50 mg total) by mouth daily. 90 tablet 3    metoprolol succinate ER 25 MG Oral Tablet 24 Hr Take 1 tablet (25 mg total) by mouth daily. 30 tablet 11    metoprolol succinate ER 25 MG Oral Tablet 24 Hr Take 1 tablet (25 mg total) by mouth daily. 30 tablet 11    acetaminophen 500 MG Oral Tab Take 1 tablet (500 mg total) by mouth every 6 (six) hours as needed for Pain. (Patient not taking: Reported on 2/8/2024)      Cholecalciferol 5000 units Oral Tab Take 2 tablets (10,000 Units total) by mouth daily.      Ascorbic Acid (VITAMIN C) 100 MG Oral Tab Take 1 tablet (100 mg total) by mouth daily.      Naproxen Sodium 220 MG Oral Cap Take 220 mg by mouth.      Multiple Vitamin (ONE-A-DAY MENS) Oral Tab Take 1 tablet by mouth daily.        Past Medical History:    Essential hypertension    Essential hypertension, benign    Uncontrolled       Past Surgical History:    Procedure Laterality Date    Egd N/A 7/14/2020    Procedure: ESOPHAGOGASTRODUODENOSCOPY, COLONOSCOPY, POSSIBLE BIOPSY, POSSIBLE POLYPECTOMY 17879, 60487;  Surgeon: Tal Tariq MD;  Location: [facility]         Social History     Socioeconomic History    Marital status:    Tobacco Use    Smoking status: Never    Smokeless tobacco: Never   Substance and Sexual Activity    Alcohol use: No     Alcohol/week: 0.0 standard drinks of alcohol    Drug use: No   Other Topics Concern    Caffeine Concern No    Exercise Yes     Comment: Walking twice weekly         REVIEW OF SYSTEMS:   GENERAL: normal appetite  SKIN: no rashes or abnormal skin lesions  HEENT: See HPI  LUNGS: See HPI  CARDIOVASCULAR: denies chest pain or palpitations   GI: denies N/V/C or abdominal pain      EXAM:   /82   Pulse 93   Temp 98.5 °F (36.9 °C) (Oral)   Resp 16   Ht 5' 11\" (1.803 m)   Wt 233 lb (105.7 kg)   SpO2 98%   BMI 32.50 kg/m²   GENERAL: well developed, well nourished,in no apparent distress  SKIN: no rashes,no suspicious lesions  HEAD: atraumatic, normocephalic.  no tenderness on palpation of  sinuses  EYES: conjunctiva clear, EOM intact  EARS: TM's clear bilaterally  NOSE: Nostrils patent, clear nasal discharge, nasal mucosa boggy   THROAT: Oral mucosa pink, moist. Posterior pharynx is mildly injected, no hypertrophy, no exudates, uvula midline, clear post nasal drainage.   NECK: Supple, non-tender  LUNGS: clear to auscultation bilaterally, no wheezes or rhonchi. Breathing is non labored.  CARDIO: RRR without murmur  EXTREMITIES: no cyanosis, clubbing or edema  LYMPH:  shotty ant cervical LAD.     Recent Results (from the past 24 hour(s))   Rapid Strep    Collection Time: 07/29/24  9:03 AM   Result Value Ref Range    Strep Grp A Screen Negative Negative    Control Line Present with a clear background (yes/no) Yes Yes/No    Kit Lot # [id number] Numeric    Kit Expiration Date 5/21/2025 Date   Rapid Covid-19    Collection  Time: 07/29/24  9:03 AM    Specimen: Nares   Result Value Ref Range    Rapid SARS-CoV-2 by PCR Not Detected Not Detected    POCT Lot Number [id number]     POCT Expiration Date 11/19/2025     POCT Procedure Control Control Valid Control Valid     ,179          ASSESSMENT AND PLAN:   Kranthi Parmar is a 54 year old male who presents with upper respiratory symptoms that are consistent with    ASSESSMENT:   Encounter Diagnoses   Name Primary?    Suspected COVID-19 virus infection Yes    Sore throat     Acute viral syndrome        PLAN:    Rapid strep and Rapid COVID-19 PCR negative,  Pt requests throat cx given prior h/o identical sx with strep dx via culture.    Throat c pending.   Reviewed viral etiology with expected course/duration.  Sx relief discussed, see AVs.   Work excuse note issued for today per request.     The patient indicates understanding of these issues and agrees to the plan.  The patient is asked to f/u with PCP if sx's persist or worsen.  Patient Instructions   1 . Rapid strep screen and rapid COVID-19 PCR negative.  2.  Throat culture pending, results anticipated in 48-2 hours.   3.  Encourage fluids, humidifier/vaporizor at bedside, elevate head of bed (sleep with extra pillow), vapor rub to chest, steam therapy if no fever, warm compresses for sinus pressure if no fever, salt water gargles for sore throat, lozenges for sore throat, may try over the counter saline nasal spray or irrigation kit (use distilled water with irrigation kit) for sinus pressure/congestion, get plenty of rest.          Follow up with your primary care provider if your symptoms fail to improve and resolve as anticipated    Go to the Immediate Care or Emergency Department in event of new or worsening symptoms at any time

## 2024-09-09 RX ORDER — LOSARTAN POTASSIUM 100 MG/1
100 TABLET ORAL DAILY
Qty: 90 TABLET | Refills: 0 | Status: SHIPPED | OUTPATIENT
Start: 2024-09-09

## 2024-09-09 NOTE — TELEPHONE ENCOUNTER
Requested Renewals     Name from pharmacy: Losartan Potassium 100mg Tablet         Will file in chart as: LOSARTAN 100 MG Oral Tab    The original prescription was discontinued on 6/12/2024 by Mimi Callejas for the following reason: Physician directed. Renewing this prescription may not be appropriate.    Sig: Take 1 tablet by mouth daily.    Disp: 90 tablet    Refills: 0    Start: 9/5/2024    Class: Normal    Non-formulary    Last ordered: 7 months ago (1/31/2024) by Doe Carranza MD    Last refill: 9/4/2024    Rx #: [id number]    Hypertension Medications Protocol Fyxnhp0709/05/2024 04:15 AM   Protocol Details CMP or BMP in past 12 months    Last BP reading less than 140/90    In person appointment or virtual visit in the past 12 mos or appointment in next 3 mos    EGFRCR or GFRAA > 50             No future appointments.  LOV: 5/17/24 telemedicine  Last physical exam done 11/18/23.  MCM sent to patient to verify is he is taking this medication since it shows it has been discontinued.

## 2024-09-17 ENCOUNTER — OFFICE VISIT (OUTPATIENT)
Dept: FAMILY MEDICINE CLINIC | Facility: CLINIC | Age: 54
End: 2024-09-17
Payer: COMMERCIAL

## 2024-09-17 VITALS
OXYGEN SATURATION: 98 % | WEIGHT: 232 LBS | BODY MASS INDEX: 32.48 KG/M2 | DIASTOLIC BLOOD PRESSURE: 82 MMHG | HEIGHT: 71 IN | TEMPERATURE: 98 F | SYSTOLIC BLOOD PRESSURE: 128 MMHG | HEART RATE: 96 BPM | RESPIRATION RATE: 16 BRPM

## 2024-09-17 DIAGNOSIS — U07.1 POSITIVE SELF-ADMINISTERED ANTIGEN TEST FOR COVID-19: ICD-10-CM

## 2024-09-17 DIAGNOSIS — U07.1 COVID: Primary | ICD-10-CM

## 2024-09-17 LAB
OPERATOR ID: ABNORMAL
RAPID SARS-COV-2 BY PCR: DETECTED

## 2024-09-17 PROCEDURE — U0002 COVID-19 LAB TEST NON-CDC: HCPCS | Performed by: NURSE PRACTITIONER

## 2024-09-17 PROCEDURE — 3008F BODY MASS INDEX DOCD: CPT | Performed by: NURSE PRACTITIONER

## 2024-09-17 PROCEDURE — 3079F DIAST BP 80-89 MM HG: CPT | Performed by: NURSE PRACTITIONER

## 2024-09-17 PROCEDURE — 3074F SYST BP LT 130 MM HG: CPT | Performed by: NURSE PRACTITIONER

## 2024-09-17 PROCEDURE — 99213 OFFICE O/P EST LOW 20 MIN: CPT | Performed by: NURSE PRACTITIONER

## 2024-09-17 RX ORDER — BENZONATATE 200 MG/1
200 CAPSULE ORAL 3 TIMES DAILY PRN
Qty: 20 CAPSULE | Refills: 0 | Status: SHIPPED | OUTPATIENT
Start: 2024-09-17 | End: 2024-09-24

## 2024-09-17 NOTE — PROGRESS NOTES
CHIEF COMPLAINT:     Chief Complaint   Patient presents with    Cough     Cough, ST, loss of taste, body aches, diarrhea, HA  Positive Covid test yesterday   Sx onset Sat  Denies fever   OTC cough syrup, zinc   Requesting Covid test to confirm        HPI:   Kranthi Parmar is a 54 year old male who presents for upper respiratory symptoms for  3 days. Patient reports congestion, dry cough, cough is keeping pt up at night, body aches . Symptoms have been worsening since onset.  Treating symptoms with OTC cold meds, Aleve.  + home Covid test.      Current Outpatient Medications   Medication Sig Dispense Refill    benzonatate 200 MG Oral Cap Take 1 capsule (200 mg total) by mouth 3 (three) times daily as needed. 20 capsule 0    nirmatrelvir-ritonavir 300-100 MG Oral Tablet Therapy Pack Take two nirmatrelvir tablets (300mg) with one ritonavir tablet (100mg) together twice daily for 5 days. 30 tablet 0    LOSARTAN 100 MG Oral Tab Take 1 tablet by mouth daily. 90 tablet 0    AMLODIPINE 10 MG Oral Tab Take 1 tablet by mouth daily. 90 tablet 0    cloNIDine 0.1 MG Oral Tab Take 1 tablet (0.1 mg total) by mouth 2 (two) times daily. 60 tablet 11    metoprolol succinate ER 50 MG Oral Tablet 24 Hr Take 1 tablet (50 mg total) by mouth daily. (Patient not taking: Reported on 9/17/2024) 90 tablet 3    metoprolol succinate ER 25 MG Oral Tablet 24 Hr Take 1 tablet (25 mg total) by mouth daily. (Patient not taking: Reported on 9/17/2024) 30 tablet 11    metoprolol succinate ER 25 MG Oral Tablet 24 Hr Take 1 tablet (25 mg total) by mouth daily. (Patient not taking: Reported on 9/17/2024) 30 tablet 11    acetaminophen 500 MG Oral Tab Take 1 tablet (500 mg total) by mouth every 6 (six) hours as needed for Pain. (Patient not taking: Reported on 2/8/2024)      Cholecalciferol 5000 units Oral Tab Take 2 tablets (10,000 Units total) by mouth daily.      Ascorbic Acid (VITAMIN C) 100 MG Oral Tab Take 1 tablet (100 mg total) by mouth daily.       Naproxen Sodium 220 MG Oral Cap Take 220 mg by mouth.      Multiple Vitamin (ONE-A-DAY MENS) Oral Tab Take 1 tablet by mouth daily.        Past Medical History:    Essential hypertension    Essential hypertension, benign    Uncontrolled       Past Surgical History:   Procedure Laterality Date    Egd N/A 7/14/2020    Procedure: ESOPHAGOGASTRODUODENOSCOPY, COLONOSCOPY, POSSIBLE BIOPSY, POSSIBLE POLYPECTOMY 10603, 88117;  Surgeon: Tal Tariq MD;  Location: [facility]         Social History     Socioeconomic History    Marital status:    Tobacco Use    Smoking status: Never    Smokeless tobacco: Never   Substance and Sexual Activity    Alcohol use: No     Alcohol/week: 0.0 standard drinks of alcohol    Drug use: No   Other Topics Concern    Caffeine Concern No    Exercise Yes     Comment: Walking twice weekly         REVIEW OF SYSTEMS:   GENERAL: no change in appetite  SKIN: no rashes or abnormal skin lesions  HEENT: See HPI  LUNGS: See HPI  CARDIOVASCULAR: denies chest pain or palpitations   GI: denies N/V/C or abdominal pain      EXAM:   /82   Pulse 96   Temp 98 °F (36.7 °C)   Resp 16   Ht 5' 11\" (1.803 m)   Wt 232 lb (105.2 kg)   SpO2 98%   BMI 32.36 kg/m²   GENERAL: well developed, well nourished,in no apparent distress  SKIN: no rashes,no suspicious lesions  HEAD: atraumatic, normocephalic.  no tenderness on palpation of sinuses  EYES: conjunctiva clear, EOM intact  EARS: TM's without erythema, no bulging, no retraction,no fluid, bony landmarks visible  NOSE: Nostrils patent, no nasal discharge, nasal mucosa not inflamed   THROAT: Oral mucosa pink, moist. Posterior pharynx is not erythematous. no exudates. Tonsils 2/4.    NECK: Supple, non-tender  LUNGS: clear to auscultation bilaterally, no wheezes or rhonchi. Breathing is non labored.  CARDIO: RRR without murmur  EXTREMITIES: no cyanosis, clubbing or edema  LYMPH:  no cervical lymphadenopathy.      Recent Results (from the past 24  hour(s))   Rapid Covid-19    Collection Time: 09/17/24  9:19 AM    Specimen: Nares   Result Value Ref Range    Rapid SARS-CoV-2 by PCR Detected (A) Not Detected    POCT Lot Number [id number]     POCT Expiration Date 2/28/26     POCT Procedure Control Control Valid Control Valid     ,693        ASSESSMENT AND PLAN:   Kranthi Parmar is a 54 year old male who presents with upper respiratory symptoms that are consistent with    ASSESSMENT:   Encounter Diagnoses   Name Primary?    Positive self-administered antigen test for COVID-19     COVID Yes       PLAN: Meds as below.  Unsure if he will start Paxlovid.  Discussed monitoring BP if he does and possibly skipping every other day of amlodipine.  Patient verbalized understanding.  Comfort care as described in Patient Instructions    Meds & Refills for this Visit:  Requested Prescriptions     Signed Prescriptions Disp Refills    benzonatate 200 MG Oral Cap 20 capsule 0     Sig: Take 1 capsule (200 mg total) by mouth 3 (three) times daily as needed.    nirmatrelvir-ritonavir 300-100 MG Oral Tablet Therapy Pack 30 tablet 0     Sig: Take two nirmatrelvir tablets (300mg) with one ritonavir tablet (100mg) together twice daily for 5 days.     Risks, benefits, and side effects of medication explained and discussed.    The patient indicates understanding of these issues and agrees to the plan.  The patient is asked to f/u with PCP if sx's persist or worsen.  There are no Patient Instructions on file for this visit.

## 2024-09-17 NOTE — PATIENT INSTRUCTIONS
I recommend the 4 H's for inflammation:    1. Heat (warm mist from the shower or warm liquids such as tea)  2. Honey (mixed in your tea or by the spoonful [if you are not diabetic; over the age of 1 year]--take a spoonful 3 times a day and don't eat or drink anything for 15-20 minutes)  3. Humidity--cool mist in the bedroom at night  4. Hydration --at least 8 -10 glasses a day     Monitor BP if you start Paxlovid, Consider taking amlodipine every other day while on Paxlovid and for 3 days after completing course.

## 2024-10-05 DIAGNOSIS — I10 ESSENTIAL HYPERTENSION, BENIGN: ICD-10-CM

## 2024-10-09 RX ORDER — AMLODIPINE BESYLATE 10 MG/1
TABLET ORAL
Qty: 90 TABLET | Refills: 0 | Status: SHIPPED | OUTPATIENT
Start: 2024-10-09

## 2024-10-09 NOTE — TELEPHONE ENCOUNTER
Requested Renewals     Name from pharmacy: Amlodipine Besylate 10mg Tablet         Will file in chart as: AMLODIPINE 10 MG Oral Tab    Sig: Take 1 tablet by mouth daily. Please contact prescriber to schedule an appointment for further refills.    Disp: 90 tablet    Refills: 0 (Pharmacy requested: Not specified)    Start: 10/5/2024    Class: Normal    Non-formulary For: Essential hypertension, benign    Last ordered: 2 months ago (7/12/2024) by Doe Carranza MD    Last refill: 9/4/2024    Rx #: [id number]    Hypertension Medications Protocol Rjtnca87/05/2024 04:51 AM   Protocol Details CMP or BMP in past 12 months    Last BP reading less than 140/90    In person appointment or virtual visit in the past 12 mos or appointment in next 3 mos    EGFRCR or GFRAA > 50             No future appointments.  LOV: 1/15/24 telemedicine   Last physical done 11/18/23  RX sent.

## 2024-10-23 DIAGNOSIS — I10 ESSENTIAL HYPERTENSION, BENIGN: ICD-10-CM

## 2024-10-23 RX ORDER — AMLODIPINE BESYLATE 10 MG/1
TABLET ORAL
Qty: 90 TABLET | Refills: 0 | Status: CANCELLED | OUTPATIENT
Start: 2024-10-23

## 2024-12-04 RX ORDER — LOSARTAN POTASSIUM 100 MG/1
100 TABLET ORAL DAILY
Qty: 90 TABLET | Refills: 0 | OUTPATIENT
Start: 2024-12-04

## 2024-12-04 NOTE — TELEPHONE ENCOUNTER
Fax received from [facility] pharmacy requesting r/f on Losartan 100 mg tabs.    No future appointments.    LOV: cpx: 11/18/23  Last r/f: 9/9/24 # 90 0 r/fs  Labs: bmp: 3/2/24     RTC: yearly    Please advise

## 2024-12-05 RX ORDER — LOSARTAN POTASSIUM 100 MG/1
100 TABLET ORAL DAILY
Qty: 90 TABLET | Refills: 0 | Status: SHIPPED | OUTPATIENT
Start: 2024-12-05

## 2024-12-20 ENCOUNTER — TELEPHONE (OUTPATIENT)
Dept: FAMILY MEDICINE CLINIC | Facility: CLINIC | Age: 54
End: 2024-12-20

## 2024-12-20 DIAGNOSIS — J02.9 SORE THROAT: Primary | ICD-10-CM

## 2025-01-03 DIAGNOSIS — I10 ESSENTIAL HYPERTENSION, BENIGN: ICD-10-CM

## 2025-01-03 RX ORDER — AMLODIPINE BESYLATE 10 MG/1
TABLET ORAL
Qty: 30 TABLET | Refills: 0 | Status: SHIPPED | OUTPATIENT
Start: 2025-01-03

## 2025-01-24 ENCOUNTER — OFFICE VISIT (OUTPATIENT)
Dept: FAMILY MEDICINE CLINIC | Facility: CLINIC | Age: 55
End: 2025-01-24
Payer: COMMERCIAL

## 2025-01-24 VITALS
HEART RATE: 96 BPM | SYSTOLIC BLOOD PRESSURE: 134 MMHG | OXYGEN SATURATION: 98 % | TEMPERATURE: 98 F | BODY MASS INDEX: 31 KG/M2 | RESPIRATION RATE: 16 BRPM | DIASTOLIC BLOOD PRESSURE: 80 MMHG | WEIGHT: 225 LBS

## 2025-01-24 DIAGNOSIS — R68.89 FLU-LIKE SYMPTOMS: ICD-10-CM

## 2025-01-24 DIAGNOSIS — B34.9 ACUTE VIRAL SYNDROME: Primary | ICD-10-CM

## 2025-01-24 LAB
OPERATOR ID: NORMAL
RAPID SARS-COV-2 BY PCR: NOT DETECTED

## 2025-01-24 PROCEDURE — U0002 COVID-19 LAB TEST NON-CDC: HCPCS | Performed by: PHYSICIAN ASSISTANT

## 2025-01-24 PROCEDURE — 3075F SYST BP GE 130 - 139MM HG: CPT | Performed by: PHYSICIAN ASSISTANT

## 2025-01-24 PROCEDURE — 3079F DIAST BP 80-89 MM HG: CPT | Performed by: PHYSICIAN ASSISTANT

## 2025-01-24 PROCEDURE — 87637 SARSCOV2&INF A&B&RSV AMP PRB: CPT | Performed by: PHYSICIAN ASSISTANT

## 2025-01-24 PROCEDURE — 99213 OFFICE O/P EST LOW 20 MIN: CPT | Performed by: PHYSICIAN ASSISTANT

## 2025-01-24 RX ORDER — OSELTAMIVIR PHOSPHATE 75 MG/1
75 CAPSULE ORAL 2 TIMES DAILY
Qty: 10 CAPSULE | Refills: 0 | Status: SHIPPED | OUTPATIENT
Start: 2025-01-24 | End: 2025-01-29

## 2025-01-24 RX ORDER — BENZONATATE 200 MG/1
200 CAPSULE ORAL 3 TIMES DAILY PRN
Qty: 21 CAPSULE | Refills: 0 | Status: SHIPPED | OUTPATIENT
Start: 2025-01-24 | End: 2025-01-31

## 2025-01-24 NOTE — PROGRESS NOTES
CHIEF COMPLAINT:     Chief Complaint   Patient presents with    Cough     Head/body aches for 2 day.  OTC meds taken.         HPI:   Kranthi Parmar is a 54 year old male who presents with complaints of feeling sick for the past 2 days.  Symptoms include nasal congestion, nasal drainage, body aches, headache, cough, and subjective fever.  The patient did not take his temperature with a thermometer.  The patient is taking multiple antipyretics.  The patient last had Aleve 7 hours ago.  Patient denies ear pain, sore throat, chest pain, shortness of breath, wheezing, abdominal pain, vomiting, diarrhea, and rash.  The patient is taking over-the-counter Excedrin, Riddhi-Woodworth plus, Vicks, Dominguez's, and Aleve without relief.    Current Outpatient Medications   Medication Sig Dispense Refill    benzonatate 200 MG Oral Cap Take 1 capsule (200 mg total) by mouth 3 (three) times daily as needed for cough. 21 capsule 0    oseltamivir (TAMIFLU) 75 MG Oral Cap Take 1 capsule (75 mg total) by mouth 2 (two) times daily for 5 days. 10 capsule 0    amLODIPine 10 MG Oral Tab Take 1 tablet by mouth daily. Please contact prescriber to schedule an appointment for further refills. 30 tablet 0    losartan 100 MG Oral Tab Take 1 tablet (100 mg total) by mouth daily. 90 tablet 0    cloNIDine 0.1 MG Oral Tab Take 1 tablet (0.1 mg total) by mouth 2 (two) times daily. 60 tablet 11    metoprolol succinate ER 50 MG Oral Tablet 24 Hr Take 1 tablet (50 mg total) by mouth daily. (Patient not taking: Reported on 9/17/2024) 90 tablet 3    metoprolol succinate ER 25 MG Oral Tablet 24 Hr Take 1 tablet (25 mg total) by mouth daily. (Patient not taking: Reported on 9/17/2024) 30 tablet 11    metoprolol succinate ER 25 MG Oral Tablet 24 Hr Take 1 tablet (25 mg total) by mouth daily. (Patient not taking: Reported on 9/17/2024) 30 tablet 11    acetaminophen 500 MG Oral Tab Take 1 tablet (500 mg total) by mouth every 6 (six) hours as needed for Pain.  (Patient not taking: Reported on 2/8/2024)      Cholecalciferol 5000 units Oral Tab Take 2 tablets (10,000 Units total) by mouth daily.      Ascorbic Acid (VITAMIN C) 100 MG Oral Tab Take 1 tablet (100 mg total) by mouth daily.      Naproxen Sodium 220 MG Oral Cap Take 220 mg by mouth.      Multiple Vitamin (ONE-A-DAY MENS) Oral Tab Take 1 tablet by mouth daily.        Past Medical History:    Essential hypertension    Essential hypertension, benign    Uncontrolled       Social History:  Social History     Socioeconomic History    Marital status:    Tobacco Use    Smoking status: Never    Smokeless tobacco: Never   Substance and Sexual Activity    Alcohol use: No     Alcohol/week: 0.0 standard drinks of alcohol    Drug use: No   Other Topics Concern    Caffeine Concern No    Exercise Yes     Comment: Walking twice weekly        REVIEW OF SYSTEMS:   GENERAL: See HPI  SKIN: Denies rashes, skin wounds or ulcers.  EYES: Denies blurred vision or double vision  HENT: See HPI  CHEST: Denies chest pain, or palpitations  LUNGS: See HPI  GI: Denies abdominal pain, N/V/C/D.   MUSCULOSKELETAL: no arthralgia or swollen joints  LYMPH:  Denies lymphadenopathy  NEURO: Denies headaches or lightheadedness      EXAM:   /80   Pulse 96   Temp 98.4 °F (36.9 °C) (Oral)   Resp 16   Wt 225 lb (102.1 kg)   SpO2 98%   BMI 31.38 kg/m²   GENERAL: well developed, well nourished,in no apparent distress, cooperative   SKIN: no rashes, nosuspicious lesions, no abnormal pigmentation  HEAD: atraumatic, normocephalic  EYES: EOM intact, PERRL.  Conjunctiva normal.  Cornea clear.  Lid margins normal.  No active drainage.  EARS: Right TM normal, no bulging, no retraction, no fluid, bony landmarks normal.  Left TM normal, no bulging, no retraction, no fluid, bony landmarks normal.    NOSE: nostrils patent, + discharge, nasal mucosa pink and not inflamed.  No sinus tenderness.  THROAT: oral mucosa pink, moist and intact. No visible  dental caries. Posterior pharynx without erythema or exudates.  NECK: supple, non-tender.  LUNGS: clear to auscultation bilaterally, no wheezes or rhonchi. Breathing is non labored. +Cough  CARDIO: RRR without murmur  GI: No visible scars, or masses. BS's present x4. No palpable masses or hepatosplenomegaly.  Non tender.  No guarding or rebound tenderness  EXTREMITIES: no cyanosis, clubbing or edema.  Homans NEG.  Dorsalis Pedis 2+.  LYMPH:  No lymphadenopathy.    NEURO: A&Ox3.  CN II-XII intact.  No focal deficits.  Coordination and Gait normal.  Kernig and Brudzinski's are negative.    Rapid COVID is NEGATIVE      ASSESSMENT AND PLAN:     ASSESSMENT:  Encounter Diagnoses   Name Primary?    Acute viral syndrome Yes    Flu-like symptoms        PLAN:    Patient Instructions   OTC symptom support  Alinity sent  Tessalon for cough  Tamiflu- stop if flu negative  If worse seek treatment

## 2025-01-24 NOTE — PATIENT INSTRUCTIONS
OTC symptom support  Nadeem sent  Tessalon for cough  Tamiflu- stop if flu negative  If worse seek treatment

## 2025-01-25 LAB
FLUAV + FLUBV RNA SPEC NAA+PROBE: DETECTED
FLUAV + FLUBV RNA SPEC NAA+PROBE: NOT DETECTED
RSV RNA SPEC NAA+PROBE: NOT DETECTED
SARS-COV-2 RNA RESP QL NAA+PROBE: NOT DETECTED

## 2025-02-06 DIAGNOSIS — I10 ESSENTIAL HYPERTENSION, BENIGN: ICD-10-CM

## 2025-02-06 RX ORDER — AMLODIPINE BESYLATE 10 MG/1
10 TABLET ORAL DAILY
Qty: 30 TABLET | Refills: 0 | Status: SHIPPED | OUTPATIENT
Start: 2025-02-06

## 2025-02-06 NOTE — TELEPHONE ENCOUNTER
Requesting Amlodipine 10mg  LOV: 5/17/24 Telemedicine  RTC: 2 weeks  Last Relevant Labs: 1/27/24  Filled: 1/3/25 #30 with 0 refills    Future Appointments   Date Time Provider Department Center   2/12/2025  3:30 PM Maikel Michael MD [id number]     Hypertension Medications Protocol [id number]/06/2025 04:20 PM   Protocol Details   Medication is active on med list    CMP or BMP in past 12 months    Last BP reading less than 140/90    In person appointment or virtual visit in the past 12 mos or appointment in next 3 mos    EGFRCR or GFRAA > 50     Rx sent to pharmacy per protocol

## 2025-02-12 ENCOUNTER — OFFICE VISIT (OUTPATIENT)
Facility: LOCATION | Age: 55
End: 2025-02-12
Payer: COMMERCIAL

## 2025-02-12 DIAGNOSIS — J35.01 CHRONIC TONSILLITIS: Primary | ICD-10-CM

## 2025-02-12 PROCEDURE — 99204 OFFICE O/P NEW MOD 45 MIN: CPT | Performed by: OTOLARYNGOLOGY

## 2025-02-12 RX ORDER — CEFUROXIME AXETIL 250 MG/1
250 TABLET ORAL 2 TIMES DAILY
Qty: 30 TABLET | Refills: 1 | Status: SHIPPED | OUTPATIENT
Start: 2025-02-12

## 2025-02-12 NOTE — PROGRESS NOTES
Kranthi Parmar is a 55 year old male. No chief complaint on file.    HPI:   55-year-old black male with history of recurrent strep throats and tonsil stones here for evaluation as to recommendation  Current Outpatient Medications   Medication Sig Dispense Refill    amLODIPine 10 MG Oral Tab Take 1 tablet (10 mg total) by mouth daily. 30 tablet 0    losartan 100 MG Oral Tab Take 1 tablet (100 mg total) by mouth daily. 90 tablet 0    cloNIDine 0.1 MG Oral Tab Take 1 tablet (0.1 mg total) by mouth 2 (two) times daily. 60 tablet 11    metoprolol succinate ER 50 MG Oral Tablet 24 Hr Take 1 tablet (50 mg total) by mouth daily. (Patient not taking: Reported on 9/17/2024) 90 tablet 3    metoprolol succinate ER 25 MG Oral Tablet 24 Hr Take 1 tablet (25 mg total) by mouth daily. (Patient not taking: Reported on 9/17/2024) 30 tablet 11    metoprolol succinate ER 25 MG Oral Tablet 24 Hr Take 1 tablet (25 mg total) by mouth daily. (Patient not taking: Reported on 9/17/2024) 30 tablet 11    acetaminophen 500 MG Oral Tab Take 1 tablet (500 mg total) by mouth every 6 (six) hours as needed for Pain. (Patient not taking: Reported on 2/8/2024)      Cholecalciferol 5000 units Oral Tab Take 2 tablets (10,000 Units total) by mouth daily.      Ascorbic Acid (VITAMIN C) 100 MG Oral Tab Take 1 tablet (100 mg total) by mouth daily.      Naproxen Sodium 220 MG Oral Cap Take 220 mg by mouth.      Multiple Vitamin (ONE-A-DAY MENS) Oral Tab Take 1 tablet by mouth daily.        Past Medical History:    Essential hypertension    Essential hypertension, benign    Uncontrolled       Social History:  Social History     Socioeconomic History    Marital status:    Tobacco Use    Smoking status: Never    Smokeless tobacco: Never   Substance and Sexual Activity    Alcohol use: No     Alcohol/week: 0.0 standard drinks of alcohol    Drug use: No   Other Topics Concern    Caffeine Concern No    Exercise Yes     Comment: Walking twice weekly       Past Surgical History:   Procedure Laterality Date    Egd N/A 7/14/2020    Procedure: ESOPHAGOGASTRODUODENOSCOPY, COLONOSCOPY, POSSIBLE BIOPSY, POSSIBLE POLYPECTOMY 57614, 37550;  Surgeon: Tal Tariq MD;  Location: [facility]         REVIEW OF SYSTEMS:   GENERAL HEALTH: feels well otherwise  GENERAL : denies fever, chills, sweats, weight loss, weight gain  SKIN: denies any unusual skin lesions or rashes  RESPIRATORY: denies shortness of breath with exertion  NEURO: denies headaches    EXAM:   There were no vitals taken for this visit.    System Pertinent findings Details   Constitutional  Overall appearance - Normal.   Psychiatric  Orientation - Oriented to time, place, person & situation. Appropriate mood and affect.   Head/Face  Facial features -- Normal. Skull - Normal.   Eyes  Pupils equal ,round ,react to light and accomidate   Ears  External Ear Right: Normal, Left: Normal. Canal - Right: Normal, Left: Normal. TM - Right: Normal left: Normal   Nose  External Nose, Normal, Septum -  Nasal Vault, clear,Turbinates - Right: Normal left: Normal   Mouth/Throat  Lips/teeth/gums - Normal. Tonsils -2+ cryptic. Oropharynx - Normal.   Neck Exam  Inspection - Normal. Palpation - Normal. Parotid gland - Normal. Thyroid gland -normal   Neurological  Cranial nerves - Cranial nerves II through XII grossly intact.   Nasopharynx   Normal        Lymph Detail  Submental. Submandibular. Anterior cervical. Posterior cervical. Supraclavicular.       ASSESSMENT AND PLAN:   1. Chronic tonsillitis  Discussed treatment options to include long course antibiotic with gargling technique  If not improved may need to just go forward with tonsillectomy although higher risk at his age group.  Giving him 2-week treatment with Ceftin to 50 mg 1 twice daily  Follow-up 2 to 3 months if not improving again recommending tonsillectomy      The patient indicates understanding of these issues and agrees to the plan.      Maikel Michael,  MD  2/12/2025  3:34 PM

## 2025-03-04 DIAGNOSIS — Z00.00 ROUTINE ADULT HEALTH MAINTENANCE: Primary | ICD-10-CM

## 2025-03-04 DIAGNOSIS — I10 ESSENTIAL HYPERTENSION, BENIGN: ICD-10-CM

## 2025-03-05 RX ORDER — LOSARTAN POTASSIUM 100 MG/1
100 TABLET ORAL DAILY
Qty: 90 TABLET | Refills: 0 | Status: SHIPPED | OUTPATIENT
Start: 2025-03-05

## 2025-03-05 NOTE — TELEPHONE ENCOUNTER
Name from pharmacy: Losartan Potassium 100mg Tablet         Will file in chart as: LOSARTAN 100 MG Oral Tab    Sig: Take 1 tablet by mouth daily.    Disp: 90 tablet    Refills: 0 (Pharmacy requested: Not specified)    Start: 3/4/2025    Class: Normal    Non-formulary    Last ordered: 3 months ago (12/5/2024) by Doe Carranza MD    Last refill: 3/3/2025    Rx #: [id number]    Hypertension Medications Protocol Ffbvlw1103/04/2025 04:23 AM   Protocol Details CMP or BMP in past 12 months    EGFRCR or GFRAA > 50    Last BP reading less than 140/90    In person appointment or virtual visit in the past 12 mos or appointment in next 3 mos    Medication is active on med list      [facility]: [facility], [address] [phone], [phone]     Future Appointments   Date Time Provider Department Center   4/25/2025  9:00 AM Maikel Mihcael MD [facility] [id number]     LOV: cpx: 11/18/23  Last r/f: 12/5/24 # 90 0 r/fs  Labs: 1/27/24     RTC: yearly    Please advise

## 2025-03-07 NOTE — TELEPHONE ENCOUNTER
Requesting Amlopidine 10mg  LOV: 5/17/24 Telemedicine  RTC: 2 weeks  Last Relevant Labs: 12/30/23  Filled: 2/6/25 #30 with 0 refills    Future Appointments   Date Time Provider Department Center   4/25/2025  9:00 AM Maikel Michael MD [facility] [id number]     Hypertension Medications Protocol Kvphad5203/07/2025 11:01 AM   Protocol Details   CMP or BMP in past 12 months    EGFRCR or GFRAA > 50    Medication is active on med list    Last BP reading less than 140/90    In person appointment or virtual visit in the past 12 mos or appointment in next 3 mos     Patient has not been seen in the office since 11/18/23. Please schedule appointment.

## 2025-03-10 NOTE — TELEPHONE ENCOUNTER
Future Appointments   Date Time Provider Department Center   3/27/2025  9:00 AM Oliva Ji MD [facility] [address] [facility] [address]   4/25/2025  9:00 AM Maikel Michael MD [facility] [id number]    Patient will need refill on the medication prior to his appointment.

## 2025-03-11 RX ORDER — AMLODIPINE BESYLATE 10 MG/1
TABLET ORAL
Qty: 30 TABLET | Refills: 0 | Status: SHIPPED | OUTPATIENT
Start: 2025-03-11

## 2025-03-11 NOTE — TELEPHONE ENCOUNTER
Patient scheduled appointment.  Rx sent for #30.  MCM sent to patient to complete annual fasting lab work.    Future Appointments   Date Time Provider Department Center   3/27/2025  9:00 AM Oliva Ji MD [facility] [address] [facility] [address]   4/25/2025  9:00 AM Maikel Michael MD [facility] [id number]

## 2025-03-27 ENCOUNTER — OFFICE VISIT (OUTPATIENT)
Dept: FAMILY MEDICINE CLINIC | Facility: CLINIC | Age: 55
End: 2025-03-27
Payer: COMMERCIAL

## 2025-03-27 ENCOUNTER — LAB ENCOUNTER (OUTPATIENT)
Dept: LAB | Age: 55
End: 2025-03-27
Attending: FAMILY MEDICINE
Payer: COMMERCIAL

## 2025-03-27 VITALS
RESPIRATION RATE: 16 BRPM | SYSTOLIC BLOOD PRESSURE: 120 MMHG | OXYGEN SATURATION: 98 % | WEIGHT: 225.13 LBS | TEMPERATURE: 97 F | BODY MASS INDEX: 31.52 KG/M2 | HEIGHT: 71 IN | DIASTOLIC BLOOD PRESSURE: 80 MMHG | HEART RATE: 77 BPM

## 2025-03-27 DIAGNOSIS — Z86.0100 HISTORY OF COLON POLYPS: ICD-10-CM

## 2025-03-27 DIAGNOSIS — Z00.00 WELL ADULT EXAM: Primary | ICD-10-CM

## 2025-03-27 DIAGNOSIS — I10 ESSENTIAL HYPERTENSION, BENIGN: ICD-10-CM

## 2025-03-27 DIAGNOSIS — R20.9 BILATERAL COLD FEET: ICD-10-CM

## 2025-03-27 DIAGNOSIS — Z12.11 COLON CANCER SCREENING: ICD-10-CM

## 2025-03-27 DIAGNOSIS — Z13.31 NEGATIVE DEPRESSION SCREENING: ICD-10-CM

## 2025-03-27 DIAGNOSIS — Z00.00 ROUTINE ADULT HEALTH MAINTENANCE: ICD-10-CM

## 2025-03-27 LAB
ALBUMIN SERPL-MCNC: 4.7 G/DL (ref 3.2–4.8)
ALBUMIN/GLOB SERPL: 1.7 {RATIO} (ref 1–2)
ALP LIVER SERPL-CCNC: 113 U/L
ALT SERPL-CCNC: 44 U/L
ANION GAP SERPL CALC-SCNC: 13 MMOL/L (ref 0–18)
AST SERPL-CCNC: 33 U/L (ref ?–34)
BILIRUB SERPL-MCNC: 0.5 MG/DL (ref 0.3–1.2)
BUN BLD-MCNC: 10 MG/DL (ref 9–23)
CALCIUM BLD-MCNC: 9.8 MG/DL (ref 8.7–10.6)
CHLORIDE SERPL-SCNC: 105 MMOL/L (ref 98–112)
CHOLEST SERPL-MCNC: 125 MG/DL (ref ?–200)
CO2 SERPL-SCNC: 29 MMOL/L (ref 21–32)
CREAT BLD-MCNC: 1.08 MG/DL
EGFRCR SERPLBLD CKD-EPI 2021: 82 ML/MIN/1.73M2 (ref 60–?)
ERYTHROCYTE [DISTWIDTH] IN BLOOD BY AUTOMATED COUNT: 16.6 %
FASTING PATIENT LIPID ANSWER: YES
FASTING STATUS PATIENT QL REPORTED: YES
GLOBULIN PLAS-MCNC: 2.8 G/DL (ref 2–3.5)
GLUCOSE BLD-MCNC: 97 MG/DL (ref 70–99)
HCT VFR BLD AUTO: 41 %
HDLC SERPL-MCNC: 45 MG/DL (ref 40–59)
HGB BLD-MCNC: 12.6 G/DL
LDLC SERPL CALC-MCNC: 69 MG/DL (ref ?–100)
MCH RBC QN AUTO: 24 PG (ref 26–34)
MCHC RBC AUTO-ENTMCNC: 30.7 G/DL (ref 31–37)
MCV RBC AUTO: 78.1 FL
NONHDLC SERPL-MCNC: 80 MG/DL (ref ?–130)
OSMOLALITY SERPL CALC.SUM OF ELEC: 303 MOSM/KG (ref 275–295)
PLATELET # BLD AUTO: 275 10(3)UL (ref 150–450)
POTASSIUM SERPL-SCNC: 3.3 MMOL/L (ref 3.5–5.1)
PROT SERPL-MCNC: 7.5 G/DL (ref 5.7–8.2)
RBC # BLD AUTO: 5.25 X10(6)UL
SODIUM SERPL-SCNC: 147 MMOL/L (ref 136–145)
T4 FREE SERPL-MCNC: 1.3 NG/DL (ref 0.8–1.7)
TRIGL SERPL-MCNC: 46 MG/DL (ref 30–149)
TSI SER-ACNC: 0.62 UIU/ML (ref 0.55–4.78)
VLDLC SERPL CALC-MCNC: 7 MG/DL (ref 0–30)
WBC # BLD AUTO: 4.3 X10(3) UL (ref 4–11)

## 2025-03-27 PROCEDURE — 84154 ASSAY OF PSA FREE: CPT

## 2025-03-27 PROCEDURE — 80053 COMPREHEN METABOLIC PANEL: CPT

## 2025-03-27 PROCEDURE — 84439 ASSAY OF FREE THYROXINE: CPT

## 2025-03-27 PROCEDURE — 36415 COLL VENOUS BLD VENIPUNCTURE: CPT

## 2025-03-27 PROCEDURE — 84443 ASSAY THYROID STIM HORMONE: CPT

## 2025-03-27 PROCEDURE — 85027 COMPLETE CBC AUTOMATED: CPT

## 2025-03-27 PROCEDURE — 84153 ASSAY OF PSA TOTAL: CPT

## 2025-03-27 PROCEDURE — 80061 LIPID PANEL: CPT

## 2025-03-27 RX ORDER — AMLODIPINE BESYLATE 10 MG/1
10 TABLET ORAL DAILY
Qty: 90 TABLET | Refills: 0 | Status: SHIPPED | OUTPATIENT
Start: 2025-03-27

## 2025-03-27 RX ORDER — CLONIDINE HYDROCHLORIDE 0.1 MG/1
0.1 TABLET ORAL 2 TIMES DAILY
Qty: 180 TABLET | Refills: 0 | Status: SHIPPED | OUTPATIENT
Start: 2025-03-27

## 2025-03-27 RX ORDER — LOSARTAN POTASSIUM 100 MG/1
100 TABLET ORAL DAILY
Qty: 90 TABLET | Refills: 0 | Status: SHIPPED | OUTPATIENT
Start: 2025-03-27

## 2025-03-27 NOTE — PROGRESS NOTES
Subjective:      Chief Complaint   Patient presents with    Physical    Immunization/Injection     Pt declined all vaccines     HISTORY OF PRESENT ILLNESS  HPI  HPI obtained per patient report.  Kranthi Parmar is a pleasant 55 year old male presenting for an annual physical.   He also notes foot coldness when lying down.     PAST PATIENT HISTORY  Past Medical History:    Essential hypertension    Essential hypertension, benign    Uncontrolled      Past Surgical History:   Procedure Laterality Date    Egd N/A 7/14/2020    Procedure: ESOPHAGOGASTRODUODENOSCOPY, COLONOSCOPY, POSSIBLE BIOPSY, POSSIBLE POLYPECTOMY 15208, 73646;  Surgeon: Tal Tariq MD;  Location: [facility]       CURRENT MEDICATIONS  Medications Taking[1]    HEALTH MAINTENANCE  Immunization History   Administered Date(s) Administered    Covid-19 Vaccine Pfizer 30 mcg/0.3 ml 07/23/2021, 08/13/2021   Pended Date(s) Pended    FLULAVAL 6 months & older 0.5 ml Prefilled syringe (83888) 11/09/2019   Deferred Date(s) Deferred    FLULAVAL 6 months & older 0.5 ml Prefilled syringe ([id number]) 12/12/2020       ALLERGIES AND DRUG REACTIONS  Allergies[2]    Family History   Problem Relation Age of Onset    Cancer Father     Cancer Mother      Social History     Socioeconomic History    Marital status:    Tobacco Use    Smoking status: Never    Smokeless tobacco: Never   Substance and Sexual Activity    Alcohol use: Never    Drug use: Never   Other Topics Concern    Caffeine Concern No    Stress Concern No    Weight Concern No    Special Diet No    Exercise Yes    Seat Belt No       Review of Systems   All other systems reviewed and are negative.         Objective:      /80   Pulse 77   Temp 97.4 °F (36.3 °C) (Temporal)   Resp 16   Ht 5' 11\" (1.803 m)   Wt 225 lb 2 oz (102.1 kg)   SpO2 98%   BMI 31.40 kg/m²   Body mass index is 31.4 kg/m².    Physical Exam  Vitals reviewed.   Constitutional:       General: He is not in acute distress.      Appearance: He is not ill-appearing, toxic-appearing or diaphoretic.   HENT:      Head: Normocephalic and atraumatic.   Eyes:      General: No scleral icterus.        Right eye: No discharge.         Left eye: No discharge.      Extraocular Movements: Extraocular movements intact.      Conjunctiva/sclera: Conjunctivae normal.   Cardiovascular:      Rate and Rhythm: Normal rate and regular rhythm.      Pulses: Normal pulses.      Heart sounds: Normal heart sounds.   Pulmonary:      Effort: Pulmonary effort is normal.      Breath sounds: Normal breath sounds.   Abdominal:      General: Abdomen is flat.   Musculoskeletal:      Cervical back: Neck supple. No tenderness.      Right lower leg: No edema.      Left lower leg: No edema.   Lymphadenopathy:      Cervical: No cervical adenopathy.   Skin:     General: Skin is warm and dry.      Coloration: Skin is not jaundiced or pale.      Findings: No bruising, erythema or rash.   Neurological:      Mental Status: He is alert and oriented to person, place, and time.   Psychiatric:         Mood and Affect: Mood normal.            Assessment and Plan:      1. Well adult exam (Primary)  2. Colon cancer screening  -     Gastro Referral - In Network  3. History of colon polyps  -     Gastro Referral - In Network  4. Essential hypertension, benign  Overview:  Uncontrolled    Orders:  -     amLODIPine Besylate; Take 1 tablet (10 mg total) by mouth daily.  Dispense: 90 tablet; Refill: 0  -     cloNIDine HCl; Take 1 tablet (0.1 mg total) by mouth 2 (two) times daily.  Dispense: 180 tablet; Refill: 0  -     Losartan Potassium; Take 1 tablet (100 mg total) by mouth daily.  Dispense: 90 tablet; Refill: 0  5. Bilateral cold feet  6. Negative depression screening    Return in about 1 year (around 3/27/2026) for physical.    Physical  - reminded pt to update his annual fasting labs   - he will be due for his screening colonoscopy in July. Recommended contacting his gastroenterologist's  office to schedule  - he declined immunizations     HTN  - well controlled  - continue current regimen    B/L cold feet  - TFTs are included in his lab orders for evaluation. If normal, we will consider ordering an RAFFI for further evaluation     Patient verbalized understanding of assessment and recommendations. All questions and concerns were addressed.    Electronically signed by Oliva Ji MD       [1]   Outpatient Medications Marked as Taking for the 3/27/25 encounter (Office Visit) with Oliva Ji MD   Medication Sig Dispense Refill    amLODIPine 10 MG Oral Tab Take 1 tablet (10 mg total) by mouth daily. 90 tablet 0    cloNIDine 0.1 MG Oral Tab Take 1 tablet (0.1 mg total) by mouth 2 (two) times daily. 180 tablet 0    losartan 100 MG Oral Tab Take 1 tablet (100 mg total) by mouth daily. 90 tablet 0    acetaminophen 500 MG Oral Tab Take 1 tablet (500 mg total) by mouth every 6 (six) hours as needed for Pain.      Ascorbic Acid (VITAMIN C) 100 MG Oral Tab Take 1 tablet (100 mg total) by mouth daily.      Naproxen Sodium 220 MG Oral Cap Take 220 mg by mouth.      Multiple Vitamin (ONE-A-DAY MENS) Oral Tab Take 1 tablet by mouth daily.     [2] No Known Allergies

## 2025-03-28 LAB
PROSTATE SPECIFIC AG: 0.5 NG/ML
PROSTATE SPECIFIC AG: 0.5 NG/ML

## 2025-04-18 ENCOUNTER — TELEPHONE (OUTPATIENT)
Age: 55
End: 2025-04-18

## 2025-04-18 NOTE — TELEPHONE ENCOUNTER
Pt is calling to schedule a new consultation appt.    New Consult- Kranthi Parmar 2/12/1970  Referring to: Dr. Jessica  Referral by: Doe Burkett PH.   Reason- Low hemoglobin  Insurance- [employer] PPO (E-verified)  Referral: In Epic  Please give pt a call back. Thank you.

## 2025-05-07 ENCOUNTER — OFFICE VISIT (OUTPATIENT)
Age: 55
End: 2025-05-07
Attending: INTERNAL MEDICINE
Payer: COMMERCIAL

## 2025-05-07 VITALS
HEART RATE: 83 BPM | HEIGHT: 71 IN | BODY MASS INDEX: 31.45 KG/M2 | WEIGHT: 224.63 LBS | DIASTOLIC BLOOD PRESSURE: 80 MMHG | RESPIRATION RATE: 16 BRPM | SYSTOLIC BLOOD PRESSURE: 126 MMHG | TEMPERATURE: 98 F | OXYGEN SATURATION: 99 %

## 2025-05-07 DIAGNOSIS — D64.9 ANEMIA, UNSPECIFIED TYPE: Primary | ICD-10-CM

## 2025-05-07 LAB
BASOPHILS # BLD AUTO: 0.03 X10(3) UL (ref 0–0.2)
BASOPHILS NFR BLD AUTO: 0.7 %
DEPRECATED HBV CORE AB SER IA-ACNC: 8 NG/ML (ref 50–336)
EOSINOPHIL # BLD AUTO: 0.16 X10(3) UL (ref 0–0.7)
EOSINOPHIL NFR BLD AUTO: 4 %
ERYTHROCYTE [DISTWIDTH] IN BLOOD BY AUTOMATED COUNT: 16.2 %
FOLATE SERPL-MCNC: 16.1 NG/ML (ref 5.4–?)
HCT VFR BLD AUTO: 41.3 % (ref 39–53)
HGB BLD-MCNC: 12.8 G/DL (ref 13–17.5)
HGB RETIC QN AUTO: 24.7 PG (ref 28.2–36.6)
IMM GRANULOCYTES # BLD AUTO: 0.01 X10(3) UL (ref 0–1)
IMM GRANULOCYTES NFR BLD: 0.2 %
IMM RETICS NFR: 0.16 RATIO (ref 0.1–0.3)
IRON SATN MFR SERPL: 6 % (ref 20–50)
IRON SERPL-MCNC: 21 UG/DL (ref 65–175)
LYMPHOCYTES # BLD AUTO: 1.81 X10(3) UL (ref 1–4)
LYMPHOCYTES NFR BLD AUTO: 44.7 %
MCH RBC QN AUTO: 24 PG (ref 26–34)
MCHC RBC AUTO-ENTMCNC: 31 G/DL (ref 31–37)
MCV RBC AUTO: 77.5 FL (ref 80–100)
MONOCYTES # BLD AUTO: 0.52 X10(3) UL (ref 0.1–1)
MONOCYTES NFR BLD AUTO: 12.8 %
NEUTROPHILS # BLD AUTO: 1.52 X10 (3) UL (ref 1.5–7.7)
NEUTROPHILS # BLD AUTO: 1.52 X10(3) UL (ref 1.5–7.7)
NEUTROPHILS NFR BLD AUTO: 37.6 %
PLATELET # BLD AUTO: 263 10(3)UL (ref 150–450)
RBC # BLD AUTO: 5.33 X10(6)UL (ref 4.3–5.7)
RETICS # AUTO: 59.7 X10(3) UL (ref 22.5–147.5)
RETICS/RBC NFR AUTO: 1.1 % (ref 0.5–2.5)
TOTAL IRON BINDING CAPACITY: 378 UG/DL (ref 250–425)
TRANSFERRIN SERPL-MCNC: 298 MG/DL (ref 215–365)
VIT B12 SERPL-MCNC: 628 PG/ML (ref 211–911)
WBC # BLD AUTO: 4.1 X10(3) UL (ref 4–11)

## 2025-05-07 NOTE — PROGRESS NOTES
Patient is here today for Hematology Consult with . Patient denies pain. Medication list,medical history and toxicities were reviewed and updated.     Education Record    Learner:  Patient      Disease / Diagnosis: Consult    Barriers / Limitations:  None   Comments:    Method:  Brief focused, Discussion, Printed material and Reinforcement   Comments:    General Topics:  Medication, Pain, Procedure and Plan of care reviewed   Comments:    Outcome:  Shows understanding   Comments:    AVS provided and follow up reviewed.  Patient instructed to call as needed.

## 2025-05-07 NOTE — CONSULTS
Cancer Center Report of Consultation    Patient Name: Kranthi Parmar   YOB: 1970   Medical Record Number: [id number]   CSN: [id number]   Consulting Physician: Matty Jessica M.D.   Referring Physician: No ref. provider found    Date of Consultation: 5/7/2025     Reason for Consultation (Chief Complaint):  Chief Complaint   Patient presents with    Consult     Consult with         History of Present Illness:  Kranthi Parmar is a 55 year old male referred for evaluation and treatment of microcytic anemia.  A review of the patient's labs reveals waxing and waning anemia since at least 2016.  Ferritin in 2016 was 8.  EGD and C-scope negative in 2020. Repeat is scheduled for July.  Capsule endoscopy was negative in the past, as well.     Past Medical History:  Past Medical History[1]    Past Surgical History:  Past Surgical History[2]    Gynecologic History:      Family History:  Family History[3]    Social History:  Social History     Socioeconomic History    Marital status:      Spouse name: Not on file    Number of children: Not on file    Years of education: Not on file    Highest education level: Not on file   Occupational History    Not on file   Tobacco Use    Smoking status: Never    Smokeless tobacco: Never   Vaping Use    Vaping status: Never Used   Substance and Sexual Activity    Alcohol use: Never    Drug use: Never    Sexual activity: Not on file   Other Topics Concern     Service Not Asked    Blood Transfusions Not Asked    Caffeine Concern No    Occupational Exposure Not Asked    Hobby Hazards Not Asked    Sleep Concern Not Asked    Stress Concern No    Weight Concern No    Special Diet No    Back Care Not Asked    Exercise Yes    Bike Helmet Not Asked    Seat Belt No    Self-Exams Not Asked   Social History Narrative    Not on file     Social Drivers of Health     Food Insecurity: Not on file   Transportation Needs: Not on file   Housing Stability: Not  on file       Current Medications:  Medications - Current[4]    Allergies:  Allergies[5]     Review of Systems:    Constitutional No fevers, chills, night sweats, excessive fatigue or weight loss.   Eyes No significant visual difficulties. No diplopia. No yellowing of the eyes.   Hematologic/Lymphatic No easy bruising or bleeding.  No any tender or palpable lymph nodes.   Respiratory No dyspnea, Pleuritic chest pain, cough or hemoptysis.   Cardiovascular No anginal chest pain, palpitations or orthopnea.   Gastrointestinal No nausea, vomiting, diarrhea, GI bleeding, or constipation.   Genitorurinary (M) No hematuria, dysuria, or incontinence. No abnormal bleeding.   Integumentary No rashes or yellowing of the skin   Neurologic No headache, blurred vision, and no areas of focal weakness. Normal gait.   Psychiatric No insomnia, depression, abigail or mood swings.         Vital Signs:  /80 (BP Location: Right arm, Patient Position: Sitting, Cuff Size: large)   Pulse 83   Temp 98.4 °F (36.9 °C) (Temporal)   Resp 16   Ht 1.803 m (5' 11\")   Wt 101.9 kg (224 lb 9.6 oz)   SpO2 99%   BMI 31.33 kg/m²     Performance Status:  ECOG 0    Physical Examination:    Constitutional Normal - Alert, cooperative, oriented. Mood and affect appropriate. Appears close to chronological age. Well nourished. Well developed.   Eyes Normal - Conjunctivae and sclerae are clear and without icterus. Pupils are reactive and equal.   ENMT Normal - Sinuses are nontender.  No oral exudates, ulcers, masses, thrush or mucositis. Oropharynx clear.  Tongue normal.   Neck Normal - Supple without masses or thyromegaly.   Hematologic/Lymphatic Normal - No petechiae or purpura.  No tender or palpable lymph nodes in the cervical, supraclavicular, axillary or inguinal area.   Respiratory Normal - Lungs are clear to auscultation without rhonchi or wheezing.   Cardiovascular Normal - Regular rate and rhythm of heart without murmurs, gallops or rubs.    Abdomen Normal - Non-tender, non-distended, no masses, ascites or hepatosplenomegaly. Good bowel sounds. No guarding or rebound tenderness. No pulsatile masses.   Extremities Normal - No visible deformities, no cyanosis, clubbing or edema. Pulses 4+ and equal bilaterally.   Integumentary Normal - No rashes, scars, or lesions suggestive of malignancy.   Neurologic Normal - No sensory or motor deficits, normal cerebellar function, normal gait, cranial nerves intact.   Psychiatric Normal - Alert and oriented times three. Coherent speech. Verbalizes understanding of our discussions today.       Laboratory:      Latest Reference Range & Units 03/27/25 09:31   Sodium 136 - 145 mmol/L 147 (H)   Potassium 3.5 - 5.1 mmol/L 3.3 (L)   Chloride 98 - 112 mmol/L 105   Carbon Dioxide, Total 21.0 - 32.0 mmol/L 29.0   BUN 9 - 23 mg/dL 10   CREATININE 0.70 - 1.30 mg/dL 1.08   CALCIUM 8.7 - 10.6 mg/dL 9.8   EGFR >=60 mL/min/1.73m2 82   ANION GAP 0 - 18 mmol/L 13   CALCULATED OSMOLALITY 275 - 295 mOsm/kg 303 (H)   ALKALINE PHOSPHATASE 45 - 117 U/L 113   AST (SGOT) <34 U/L 33   ALT (SGPT) 10 - 49 U/L 44   Total Bilirubin 0.3 - 1.2 mg/dL 0.5   Globulin 2.0 - 3.5 g/dL 2.8   (H): Data is abnormally high  (L): Data is abnormally low     Latest Reference Range & Units 03/27/25 09:31   WBC 4.0 - 11.0 x10(3) uL 4.3   Hemoglobin 13.0 - 17.5 g/dL 12.6 (L)   Hematocrit 39.0 - 53.0 % 41.0   Platelet Count 150.0 - 450.0 10(3)uL 275.0   RBC 4.30 - 5.70 x10(6)uL 5.25   MCH 26.0 - 34.0 pg 24.0 (L)   MCHC 31.0 - 37.0 g/dL 30.7 (L)   MCV 80.0 - 100.0 fL 78.1 (L)   RDW % 16.6   (L): Data is abnormally low         Radiology:    Pathology:    Impression and Plan:  Anemia: The patient has a long history of Microcytic, hypochromic anemia. His ferritin was low in 2016. Will initiate the workup with repeat CBC, Iron studies, B12 and Folate levels. I anticipate that he will benefit from IV iron, but we have to document the iron deficiency prior to ordering.      Planned Follow Up:  By Phone/MyChart     I spent 45 minutes face to face with the patient.  More than 50% of that time was spent counseling the patient and/or on coordination of care.  The diagnosis, prognosis, and general treatment was explained to the patient and the family.        Electronically Signed by:    Matty Jessica M.D.  [facility]         [1]   Past Medical History:   Essential hypertension    Essential hypertension, benign    Uncontrolled    [2]   Past Surgical History:  Procedure Laterality Date    Egd N/A 7/14/2020    Procedure: ESOPHAGOGASTRODUODENOSCOPY, COLONOSCOPY, POSSIBLE BIOPSY, POSSIBLE POLYPECTOMY 16606, 99411;  Surgeon: Tal Tariq MD;  Location: [facility]   [3]   Family History  Problem Relation Age of Onset    Cancer Father     Cancer Mother    [4]   Current Outpatient Medications:     aspirin-acetaminophen-caffeine 250-250-65 MG Oral Tab, Take 1 tablet by mouth every 6 (six) hours as needed for Pain., Disp: , Rfl:     amLODIPine 10 MG Oral Tab, Take 1 tablet (10 mg total) by mouth daily., Disp: 90 tablet, Rfl: 0    cloNIDine 0.1 MG Oral Tab, Take 1 tablet (0.1 mg total) by mouth 2 (two) times daily., Disp: 180 tablet, Rfl: 0    losartan 100 MG Oral Tab, Take 1 tablet (100 mg total) by mouth daily., Disp: 90 tablet, Rfl: 0    acetaminophen 500 MG Oral Tab, Take 1 tablet (500 mg total) by mouth every 6 (six) hours as needed for Pain., Disp: , Rfl:     Ascorbic Acid (VITAMIN C) 100 MG Oral Tab, Take 1 tablet (100 mg total) by mouth in the morning., Disp: , Rfl:     Naproxen Sodium 220 MG Oral Cap, Take 220 mg by mouth., Disp: , Rfl:     Multiple Vitamin (ONE-A-DAY MENS) Oral Tab, Take 1 tablet by mouth in the morning., Disp: , Rfl:   [5] No Known Allergies

## 2025-05-08 PROBLEM — D50.0 IRON DEFICIENCY ANEMIA DUE TO CHRONIC BLOOD LOSS: Status: ACTIVE | Noted: 2025-05-08

## 2025-05-13 ENCOUNTER — OFFICE VISIT (OUTPATIENT)
Age: 55
End: 2025-05-13
Attending: INTERNAL MEDICINE
Payer: COMMERCIAL

## 2025-05-13 VITALS
SYSTOLIC BLOOD PRESSURE: 114 MMHG | DIASTOLIC BLOOD PRESSURE: 68 MMHG | RESPIRATION RATE: 17 BRPM | TEMPERATURE: 98 F | HEART RATE: 80 BPM | OXYGEN SATURATION: 99 %

## 2025-05-13 DIAGNOSIS — D50.0 IRON DEFICIENCY ANEMIA DUE TO CHRONIC BLOOD LOSS: Primary | ICD-10-CM

## 2025-05-13 RX ORDER — VIT A/VIT C/VIT E/ZINC/COPPER 7160-113
1 TABLET, DELAYED RELEASE (ENTERIC COATED) ORAL 2 TIMES DAILY
COMMUNITY

## 2025-05-13 NOTE — PROGRESS NOTES
Education Record    Learner:  Patient    Disease / Diagnosis:Iron deficiency anemia due to chronic blood loss     Barriers / Limitations:  None   Comments:    Method:  Brief focused   Comments:    General Topics:  Diet, Infection, Medication, Pain, Precautions, Procedure, Side effects and symptom management, Plan of care reviewed, and Fall risk and prevention   Comments:    Outcome:  Shows understanding   Comments:  Tolerated well test and Infusing . Scheduled labs in 3 months

## 2025-06-03 ENCOUNTER — APPOINTMENT (OUTPATIENT)
Facility: LOCATION | Age: 55
End: 2025-06-03
Attending: INTERNAL MEDICINE

## 2025-07-02 DIAGNOSIS — I10 ESSENTIAL HYPERTENSION, BENIGN: ICD-10-CM

## 2025-07-02 RX ORDER — AMLODIPINE BESYLATE 10 MG/1
10 TABLET ORAL DAILY
Qty: 90 TABLET | Refills: 0 | Status: SHIPPED | OUTPATIENT
Start: 2025-07-02

## 2025-07-05 ENCOUNTER — TELEMEDICINE (OUTPATIENT)
Dept: FAMILY MEDICINE CLINIC | Facility: CLINIC | Age: 55
End: 2025-07-05
Payer: COMMERCIAL

## 2025-07-05 DIAGNOSIS — R42 DIZZINESS: Primary | ICD-10-CM

## 2025-07-05 PROCEDURE — 98004 SYNCH AUDIO-VIDEO EST SF 10: CPT | Performed by: FAMILY MEDICINE

## 2025-07-05 NOTE — PROGRESS NOTES
Subjective    This visit is conducted using Telemedicine with live, interactive video and audio.    Chief Complaint:  Chief Complaint   Patient presents with    Follow - Up         The patient confirmed knowledge of the limitations of the use of telemedicine were verbally confirmed by the provider.  Verification of patient identity was established.  Patient understands and accepts financial responsibility for any deductible, co-insurance and/or co-pays associated with this service.      HPI:    Patient complains of dizzy spells occurring for couple of months.  It is occurring intermittently.  It has not improved.  He does have a prior history of vertigo. He has hx of HTN.  He is on losartan, amlodipine, clonidine. BP at home usually normotensive.  He has seen the eye doctor couple weeks ago.   Denies any ear pain, ear fullness, ear discharge, headache, blurry vision, fatigue, chest pain, shortness of breath.  He does have episodes of ringing of the ear. Denies any neurological deficits such as numbness tingling or weakness.  Episodes occur with head movement.  They are not particularly associated with postural changes. Typically the dizziness occurs when he bends over and then stands up. Episodes of dizziness last for 1-2 minutes.   He drinks a lot of water.     Ct Head is normal.     Review of Systems   Constitutional: Negative for fever, chills and fatigue. No distress.  Respiratory: Negative for cough, chest tightness, shortness of breath   Cardiovascular: Negative for chest pain, palpitations and leg swelling.   Gastrointestinal: Negative for nausea, vomiting, abdominal pain, diarrhea     HISTORY:  Past Medical History[1]   Past Surgical History[2]   Family History[3]   Short Social Hx on File[4]           Objective    Physical Exam:  GEN: patient appears well  Respiratory effort: normal , No pursed-lip breathing, speaking in complete sentences  Neuro: Alert/oriented x3, speech is fluent, face  symmetric  Psych: Mood is stable, Affect appropriate    Assessment/Plan:    1. Dizziness  CT from 2024 normal. No alarming symptoms at this time. Could be BPPV, postural hypotension, monitor BP at home and notify if readings are low or high, meniere's.   Will start with vestibular rehab. If this is not effective then a neurology eval will be needed  - Physical Therapy Referral - [facility]  - Neuro Referral - In Network    Diagnostic rationale, follow up instructions, and strict precautions/indications for emergent direct evaluation were discussed with the patient. The patient agrees with the plan, and understands to follow up with their primary care physician or other healthcare provider within 48-72 hours for reevaluation for persistent or worsening symptoms.    Patient understands telemedicine evaluation is not a substitute for face-to-face examination or emergency care. Patient advised to go to ER or call 911 for worsening symptoms or acute distress.         Lori Kirkpatrick DO         [1]   Past Medical History:   Essential hypertension    Essential hypertension, benign    Uncontrolled    [2]   Past Surgical History:  Procedure Laterality Date    Egd N/A 7/14/2020    Procedure: ESOPHAGOGASTRODUODENOSCOPY, COLONOSCOPY, POSSIBLE BIOPSY, POSSIBLE POLYPECTOMY 42556, 76186;  Surgeon: Tal Tariq MD;  Location: [facility]   [3]   Family History  Problem Relation Age of Onset    Cancer Father 70    Other (lung cancer) Mother 69    Cancer Maternal Aunt 68   [4]   Social History  Socioeconomic History    Marital status:    Tobacco Use    Smoking status: Never    Smokeless tobacco: Never   Vaping Use    Vaping status: Never Used   Substance and Sexual Activity    Alcohol use: Never    Drug use: Never   Other Topics Concern    Caffeine Concern No    Stress Concern No    Weight Concern No    Special Diet No    Exercise Yes    Seat Belt No

## 2025-08-01 DIAGNOSIS — I10 ESSENTIAL HYPERTENSION, BENIGN: ICD-10-CM

## 2025-08-04 ENCOUNTER — TELEPHONE (OUTPATIENT)
Dept: PHYSICAL THERAPY | Facility: HOSPITAL | Age: 55
End: 2025-08-04

## 2025-08-05 ENCOUNTER — OFFICE VISIT (OUTPATIENT)
Dept: PHYSICAL THERAPY | Age: 55
End: 2025-08-05
Attending: FAMILY MEDICINE

## 2025-08-05 DIAGNOSIS — R42 DIZZINESS: Primary | ICD-10-CM

## 2025-08-05 PROCEDURE — 97112 NEUROMUSCULAR REEDUCATION: CPT

## 2025-08-05 PROCEDURE — 97162 PT EVAL MOD COMPLEX 30 MIN: CPT

## 2025-08-05 RX ORDER — CLONIDINE HYDROCHLORIDE 0.1 MG/1
0.1 TABLET ORAL 2 TIMES DAILY
Qty: 180 TABLET | Refills: 0 | Status: SHIPPED | OUTPATIENT
Start: 2025-08-05

## 2025-08-12 ENCOUNTER — NURSE ONLY (OUTPATIENT)
Facility: LOCATION | Age: 55
End: 2025-08-12
Attending: INTERNAL MEDICINE

## 2025-08-12 ENCOUNTER — OFFICE VISIT (OUTPATIENT)
Dept: PHYSICAL THERAPY | Age: 55
End: 2025-08-12
Attending: FAMILY MEDICINE

## 2025-08-12 DIAGNOSIS — D50.0 IRON DEFICIENCY ANEMIA DUE TO CHRONIC BLOOD LOSS: Primary | ICD-10-CM

## 2025-08-12 LAB
BASOPHILS # BLD AUTO: 0.04 X10(3) UL (ref 0–0.2)
BASOPHILS NFR BLD AUTO: 0.7 %
DEPRECATED HBV CORE AB SER IA-ACNC: 45 NG/ML (ref 50–336)
EOSINOPHIL # BLD AUTO: 0.19 X10(3) UL (ref 0–0.7)
EOSINOPHIL NFR BLD AUTO: 3.5 %
ERYTHROCYTE [DISTWIDTH] IN BLOOD BY AUTOMATED COUNT: 17.1 %
HCT VFR BLD AUTO: 45.4 % (ref 39–53)
HGB BLD-MCNC: 15.6 G/DL (ref 13–17.5)
IMM GRANULOCYTES # BLD AUTO: 0.01 X10(3) UL (ref 0–1)
IMM GRANULOCYTES NFR BLD: 0.2 %
IRON SATN MFR SERPL: 30 % (ref 20–50)
IRON SERPL-MCNC: 91 UG/DL (ref 65–175)
LYMPHOCYTES # BLD AUTO: 2.99 X10(3) UL (ref 1–4)
LYMPHOCYTES NFR BLD AUTO: 55.7 %
MCH RBC QN AUTO: 28.5 PG (ref 26–34)
MCHC RBC AUTO-ENTMCNC: 34.4 G/DL (ref 31–37)
MCV RBC AUTO: 82.8 FL (ref 80–100)
MONOCYTES # BLD AUTO: 0.56 X10(3) UL (ref 0.1–1)
MONOCYTES NFR BLD AUTO: 10.4 %
MORPHOLOGY: NORMAL
NEUTROPHILS # BLD AUTO: 1.58 X10 (3) UL (ref 1.5–7.7)
NEUTROPHILS # BLD AUTO: 1.58 X10(3) UL (ref 1.5–7.7)
NEUTROPHILS NFR BLD AUTO: 29.5 %
PLATELET # BLD AUTO: 235 10(3)UL (ref 150–450)
PLATELET MORPHOLOGY: NORMAL
RBC # BLD AUTO: 5.48 X10(6)UL (ref 4.3–5.7)
TOTAL IRON BINDING CAPACITY: 306 UG/DL (ref 250–425)
TRANSFERRIN SERPL-MCNC: 242 MG/DL (ref 215–365)
WBC # BLD AUTO: 5.4 X10(3) UL (ref 4–11)

## 2025-08-12 PROCEDURE — 97112 NEUROMUSCULAR REEDUCATION: CPT

## 2025-08-18 ENCOUNTER — TELEPHONE (OUTPATIENT)
Facility: LOCATION | Age: 55
End: 2025-08-18

## 2025-08-19 ENCOUNTER — OFFICE VISIT (OUTPATIENT)
Dept: PHYSICAL THERAPY | Age: 55
End: 2025-08-19
Attending: FAMILY MEDICINE

## 2025-08-19 PROCEDURE — 97112 NEUROMUSCULAR REEDUCATION: CPT

## 2025-08-26 ENCOUNTER — APPOINTMENT (OUTPATIENT)
Dept: PHYSICAL THERAPY | Age: 55
End: 2025-08-26
Attending: FAMILY MEDICINE

## 2025-08-28 ENCOUNTER — OFFICE VISIT (OUTPATIENT)
Facility: LOCATION | Age: 55
End: 2025-08-28
Attending: INTERNAL MEDICINE

## 2025-08-28 VITALS
BODY MASS INDEX: 31.5 KG/M2 | RESPIRATION RATE: 18 BRPM | SYSTOLIC BLOOD PRESSURE: 118 MMHG | HEIGHT: 70.98 IN | OXYGEN SATURATION: 98 % | DIASTOLIC BLOOD PRESSURE: 78 MMHG | HEART RATE: 90 BPM | TEMPERATURE: 98 F | WEIGHT: 225 LBS

## 2025-08-28 DIAGNOSIS — D50.0 IRON DEFICIENCY ANEMIA DUE TO CHRONIC BLOOD LOSS: Primary | ICD-10-CM

## (undated) NOTE — MR AVS SNAPSHOT
[facility] [address] [place] [address] [address] [facility]  Elisabeth [phone] [phone]               Thank you for choosing us for your health care visit with Gerhardt Guile, MD.  We are glad to serve you and happy to provide you wit and this prescription was added. Make sure you understand how and when to take each. Commonly known as:  NORVASC           hydrochlorothiazide 12.5 MG Caps   Take 1 capsule (12.5 mg total) by mouth every morning.    Commonly known as:  Arnaldo Casas Avoid over sized portions. Don’t eat while when you’re bored.      EAT THESE FOODS MORE OFTEN: EAT THESE FOODS LESS OFTEN:   Make half your plate fruits and vegetables Highly refined, white starches including white bread, rice and pasta   Eat plenty of pr

## (undated) NOTE — LETTER
Date: 9/4/2018    Patient Name: Gemma Bergman          To Whom it may concern: The above patient was seen at the [facility] for treatment of a medical condition. This patient should be excused from attending work today, 9/4/18.     Yasmany Ramon

## (undated) NOTE — LETTER
Date & Time: 4/18/2018, 9:19 AM  Patient: Scarlett Cramer  Encounter Provider(s):    Lele Mckeon MD       To Whom It May Concern:    Alok Chadwick was seen and treated in our department on 4/18/2018.  He can return to work if symptom f

## (undated) NOTE — LETTER
06/22/18        [address] [facility]      Dear Marisabel Morrell,    Our records indicate that you have outstanding lab work and or testing that was ordered for you and has not yet been completed:          HIV AG AB Combo [E]  To

## (undated) NOTE — LETTER
Date: 1/24/2025    Patient Name: Kranthi Parmar          To Whom it may concern:    This letter has been written at the patient's request. The above patient was seen at [facility] for treatment of a medical condition.    This patient should be excused from attending work/school today due to medical reasons.        Sincerely,    Ju Daly PA-C

## (undated) NOTE — MR AVS SNAPSHOT
[facility] [address] [place] [address] [address] [facility]  Elisabeth Hernandez [phone]               Thank you for choosing us for your health care visit with Nicci Nieto MD.  We are glad to serve you and happy to provide you wit office, you can view your past visit information in [employer] by going to Visits < Visit Summaries. [employer] questions? Call [phone] for help. [employer] is NOT to be used for urgent needs. For medical emergencies, dial 911.            Visit [facility]

## (undated) NOTE — LETTER
Date & Time: 4/25/2018, 6:16 PM  Patient: Milderd Ran  Encounter Provider(s):    Jessee Maki, [address]       To Whom It May Concern:    Yogesh Garcia was seen and treated in our department on 4/25/2018. He should not return to work until 4/27/18.

## (undated) NOTE — LETTER
Date: 7/29/2024    Patient Name: Kranthi Parmar          To Whom it may concern:    This letter has been written at the patient's request. The above patient was seen at [facility] for treatment of a medical condition.    This patient should be excused from attending work/school on 07/29/24.      Sincerely,    Aleah Kemp PA-C